# Patient Record
Sex: FEMALE | Race: WHITE | NOT HISPANIC OR LATINO | Employment: FULL TIME | URBAN - METROPOLITAN AREA
[De-identification: names, ages, dates, MRNs, and addresses within clinical notes are randomized per-mention and may not be internally consistent; named-entity substitution may affect disease eponyms.]

---

## 2023-10-26 NOTE — PROGRESS NOTES
Gastroenterology Office Visit     History of Present Illness:   Alvin Ge is a 42 y.o. female who presents to GI clinic for possible blockage having not moved her bowels in a week and a half.    It has been about 3 weeks since she has had a regular bowel movement. She has passed some sludge, mud, about twice and was a small to medium amount. She described it as dark brown in color and very smelly. She has has passed small pieces of formed stool.  She is straining a lot. She has noticed bright red blood on the stool. Sometimes she passes clots of blood.     She has tried taking laxative pills, milk of magnesium, prune juice,   She has not tried enemas or suppositories. She has also tried eating foods that typically cause diarrhea ie Chinese food    She is used to moving her bowels every day to every other day of normal caliber and color. She can boarder on constipation or loose stool.    She endorses feeling bloated and uncomfortable. She endorses nausea without vomiting. She denies odynophagia or dysphagia. She endorses acid reflux which is controlled on omeprazole once daily.     Last colonoscopy: 9/2017  Last endosopy: 9/2017    Abdominal surgeries include cholecystectomy and tubal ligation    Review of Systems  Review of Systems   Constitutional:  Positive for appetite change. Negative for chills, fever and unexpected weight change.   HENT:  Negative for mouth sores, sore throat and trouble swallowing.    Eyes:  Negative for pain and visual disturbance.   Respiratory:  Positive for shortness of breath (gravity). Negative for cough and wheezing.    Cardiovascular:  Negative for chest pain.   Genitourinary:  Negative for decreased urine volume, dysuria and hematuria.   Musculoskeletal:  Negative for arthralgias, joint swelling and myalgias.   Skin:  Negative for pallor and rash.   Neurological:  Negative for dizziness, weakness, numbness and headaches.       Past Medical History   has a past medical history of  A-fib (CMS/HCC), Gastro-esophageal reflux disease with esophagitis, without bleeding (10/10/2017), Gluten intolerance, and LBBB (left bundle branch block).     Past Surgical History  Past Surgical History:   Procedure Laterality Date    CHOLECYSTECTOMY  09/05/2017    Cholecystectomy    TUBAL LIGATION  09/05/2017    Tubal Ligation       Social History   reports that she quit smoking about 6 years ago. Her smoking use included cigarettes. She does not have any smokeless tobacco history on file. She reports current alcohol use. She reports that she does not use drugs.     Family History  family history includes Breast cancer in her mother; Diabetes in an other family member; Hypertension in her father and another family member; Pancreatic cancer in her father; myocardial infarction in an other family member.   No family history of stomach or colon cancer    Allergies  Allergies   Allergen Reactions    Covid-19 Vaccine, Mrna, [id number], Lnp-S (Moderna) Unknown    Gluten Unknown       Medications  Current Outpatient Medications   Medication Instructions    dilTIAZem (CARDIZEM) 30 mg, oral    magnesium oxide 400 mg magnesium capsule oral    mupirocin (Bactroban) 2 % ointment 2 times daily    naproxen (Naprosyn) 500 mg tablet 1 tablet, oral, Every 12 hours    nebivolol (Bystolic) 5 mg tablet 1 tablet, oral, Daily    nebivolol (BYSTOLIC) 2.5 mg, oral, Daily RT    omeprazole (PriLOSEC) 40 mg DR capsule 1 capsule, oral, Daily    SUMAtriptan (IMITREX) 50 mg, oral        Objective   Visit Vitals  BP (!) 144/91   Pulse 96        Physical Exam  Constitutional:       General: She is not in acute distress.     Appearance: Normal appearance.   HENT:      Mouth/Throat:      Mouth: Mucous membranes are moist.      Pharynx: Oropharynx is clear.   Eyes:      General: No scleral icterus.     Extraocular Movements: Extraocular movements intact.      Conjunctiva/sclera: Conjunctivae normal.      Pupils: Pupils are equal, round, and  reactive to light.   Cardiovascular:      Rate and Rhythm: Normal rate and regular rhythm.      Heart sounds: No murmur heard.  Pulmonary:      Effort: Pulmonary effort is normal.      Breath sounds: No wheezing or rhonchi.   Abdominal:      General: Bowel sounds are normal. There is no distension.      Palpations: Abdomen is soft. There is no mass.      Tenderness: There is no abdominal tenderness.      Hernia: No hernia is present.   Musculoskeletal:         General: No swelling or deformity.   Skin:     General: Skin is warm.      Coloration: Skin is not jaundiced.   Neurological:      General: No focal deficit present.      Mental Status: She is alert and oriented to person, place, and time.   Psychiatric:         Mood and Affect: Mood normal.         LABS  Pertinent labs were reviewed with the patient  Component  Ref Range & Units 8 mo ago   WBC  3.70 - 11.00 k/uL 9.18   RBC  3.90 - 5.20 m/uL 5.09   Hemoglobin  11.5 - 15.5 g/dL 13.2   Hematocrit  36.0 - 46.0 % 40.6   MCV  80.0 - 100.0 fL 79.8 Low    MCH  26.0 - 34.0 pg 25.9 Low    MCHC  30.5 - 36.0 g/dL 32.5   RDW-CV  11.5 - 15.0 % 13.5   Platelet Count  150 - 400 k/uL 297   MPV  9.0 - 12.7 fL 9.5   Neutrophils %  % 61.7   Abs Neut  1.45 - 7.50 k/uL 5.66   Lymphocytes %  % 29.7   Abs Lymph  1.00 - 4.00 k/uL 2.73   Monocytes %  % 5.8   Abs Mono  <0.87 k/uL 0.53   Eosinophils %  % 2.1   Abs Eosin  <0.46 k/uL 0.19   Basophils %  % 0.4   Abs Baso  <0.11 k/uL 0.04   Immature Granulocytes %  % 0.3   Abs Immature Gran  <0.10 k/uL 0.03   NRBC  /100 WBC 0.0   Absolute nRBC  <0.01 k/uL <0.01   Diff Type Auto     Component  Ref Range & Units 8 mo ago Comments   TSH  0.270 - 4.200 mIU/L 1.900      Component  Ref Range & Units 8 mo ago Comments   Protein, Total  6.3 - 8.0 g/dL 7.3    Albumin  3.9 - 4.9 g/dL 4.5    Calcium, Total  8.5 - 10.2 mg/dL 9.2    Bilirubin, Total  0.2 - 1.3 mg/dL 0.8    Alkaline Phosphatase  34 - 123 U/L 45    AST  13 - 35 U/L 20    ALT  7 - 38 U/L  22    Glucose  74 - 99 mg/dL 109 High       Radiology  Pertinent radiology was reviewed with the patient  NA    Endoscopy  Colonoscopy 9/28/2017 by Kb Tesfaye   Indication pain centered at the epigastrium  Impression  External and internal hemorrhoids found  Normal terminal ileum  Mild diverticulosis found in the descending colon and in the sigmoid colon  No inflammation seen in the TI and colon    EGD 9/28/2017 by Dr Tesfaye     FINAL DIAGNOSIS    A.  SMALL BOWEL BIOPSY:        - Duodenal mucosa with no significant histopathologic abnormalities.      B.  RIGHT COLON BIOPSY:        - Colonic mucosa with no significant histopathologic abnormalities.      C.  TRANSVERSE COLON BIOPSY:        - Colonic mucosa with foci of red cell extravasation in lamina propria,   not otherwise          diagnostic.      D.  LEFT COLON BIOPSY:        - Colonic mucosa with foci of red cell extravasation in lamina propria,   not otherwise diagnostic.     Assessment/Plan   Alvin Ge is a 42 y.o. female who presents to GI clinic for possible blockage having not moved her bowels in a week and a half..    GERD (gastroesophageal reflux disease)  Small hiatal hernia.  Currently controlled on 20 mg omeprazole daily  Educated on taking 15 to 30 minutes before her first cup of coffee a day.      Alvin was seen today for constipation.  Diagnoses and all orders for this visit:  Constipation, unspecified constipation type  -     XR abdomen 1 view; Future       Recommend follow-up in 2 months  Kay Tellez PA-C

## 2023-10-28 PROBLEM — L71.9 ROSACEA: Status: ACTIVE | Noted: 2023-10-28

## 2023-10-28 PROBLEM — R11.2 NAUSEA AND VOMITING IN ADULT: Status: ACTIVE | Noted: 2023-10-28

## 2023-10-28 PROBLEM — I49.8 ATRIAL ARRHYTHMIA: Status: ACTIVE | Noted: 2023-10-28

## 2023-10-28 PROBLEM — M62.830 BACK SPASM: Status: ACTIVE | Noted: 2023-10-28

## 2023-10-28 PROBLEM — L55.0 SUPERFICIAL SUNBURN: Status: ACTIVE | Noted: 2023-10-28

## 2023-10-28 PROBLEM — G47.33 OSA ON CPAP: Status: ACTIVE | Noted: 2023-10-28

## 2023-10-28 PROBLEM — F41.9 ANXIETY DISORDER: Status: ACTIVE | Noted: 2023-10-28

## 2023-10-28 PROBLEM — R20.2 PARESTHESIA OF LOWER LIMB: Status: ACTIVE | Noted: 2023-10-28

## 2023-10-28 PROBLEM — M54.6 MIDLINE THORACIC BACK PAIN: Status: ACTIVE | Noted: 2023-10-28

## 2023-10-28 PROBLEM — E55.9 VITAMIN D DEFICIENCY: Status: ACTIVE | Noted: 2023-10-28

## 2023-10-28 PROBLEM — K51.90 ULCERATIVE COLITIS (MULTI): Status: ACTIVE | Noted: 2023-10-28

## 2023-10-28 PROBLEM — K44.9 HIATAL HERNIA: Status: ACTIVE | Noted: 2023-10-28

## 2023-10-28 PROBLEM — R11.10 VOMITING: Status: ACTIVE | Noted: 2023-10-28

## 2023-10-28 PROBLEM — R92.8 MAMMOGRAM ABNORMAL: Status: ACTIVE | Noted: 2023-10-28

## 2023-10-28 PROBLEM — K21.9 GERD (GASTROESOPHAGEAL REFLUX DISEASE): Status: ACTIVE | Noted: 2023-10-28

## 2023-10-28 PROBLEM — K62.5 RECTAL BLEEDING: Status: ACTIVE | Noted: 2023-10-28

## 2023-10-28 PROBLEM — M54.81 OCCIPITAL NEURALGIA: Status: ACTIVE | Noted: 2023-10-28

## 2023-10-28 PROBLEM — B02.9 SHINGLES: Status: ACTIVE | Noted: 2023-10-28

## 2023-10-28 PROBLEM — R00.2 PALPITATIONS: Status: ACTIVE | Noted: 2023-10-28

## 2023-10-28 PROBLEM — R51.9 HEADACHE: Status: ACTIVE | Noted: 2023-10-28

## 2023-10-28 PROBLEM — K90.41 GLUTEN INTOLERANCE: Status: ACTIVE | Noted: 2023-10-28

## 2023-10-28 PROBLEM — R14.0 ABDOMINAL BLOATING: Status: ACTIVE | Noted: 2023-10-28

## 2023-10-28 RX ORDER — OMEPRAZOLE 40 MG/1
1 CAPSULE, DELAYED RELEASE ORAL DAILY
COMMUNITY
Start: 2021-11-15 | End: 2023-11-27 | Stop reason: WASHOUT

## 2023-10-28 RX ORDER — NEBIVOLOL 5 MG/1
1 TABLET ORAL DAILY
COMMUNITY
End: 2023-11-20 | Stop reason: ALTCHOICE

## 2023-10-28 RX ORDER — NEBIVOLOL 2.5 MG/1
2.5 TABLET ORAL
COMMUNITY
Start: 2023-06-12

## 2023-10-28 RX ORDER — DILTIAZEM HYDROCHLORIDE 30 MG/1
30 TABLET, FILM COATED ORAL
COMMUNITY

## 2023-10-28 RX ORDER — SUMATRIPTAN 50 MG/1
50 TABLET, FILM COATED ORAL
COMMUNITY
End: 2023-11-27 | Stop reason: WASHOUT

## 2023-10-28 RX ORDER — NAPROXEN 500 MG/1
1 TABLET ORAL EVERY 12 HOURS
COMMUNITY
Start: 2022-05-31 | End: 2023-11-27 | Stop reason: WASHOUT

## 2023-10-28 RX ORDER — MUPIROCIN 20 MG/G
OINTMENT TOPICAL 2 TIMES DAILY
COMMUNITY
Start: 2022-05-31 | End: 2023-11-20 | Stop reason: ALTCHOICE

## 2023-10-30 ENCOUNTER — HOSPITAL ENCOUNTER (OUTPATIENT)
Dept: RADIOLOGY | Facility: HOSPITAL | Age: 43
Discharge: HOME | End: 2023-10-30
Payer: COMMERCIAL

## 2023-10-30 ENCOUNTER — OFFICE VISIT (OUTPATIENT)
Dept: GASTROENTEROLOGY | Facility: CLINIC | Age: 43
End: 2023-10-30
Payer: COMMERCIAL

## 2023-10-30 VITALS
SYSTOLIC BLOOD PRESSURE: 144 MMHG | WEIGHT: 214.8 LBS | BODY MASS INDEX: 34.52 KG/M2 | DIASTOLIC BLOOD PRESSURE: 91 MMHG | HEART RATE: 96 BPM | HEIGHT: 66 IN

## 2023-10-30 DIAGNOSIS — K59.00 CONSTIPATION, UNSPECIFIED CONSTIPATION TYPE: Primary | ICD-10-CM

## 2023-10-30 DIAGNOSIS — K59.00 CONSTIPATION, UNSPECIFIED CONSTIPATION TYPE: ICD-10-CM

## 2023-10-30 PROBLEM — K59.09 OTHER CONSTIPATION: Status: ACTIVE | Noted: 2023-10-30

## 2023-10-30 PROBLEM — K51.90 ULCERATIVE COLITIS (MULTI): Status: RESOLVED | Noted: 2023-10-28 | Resolved: 2023-10-30

## 2023-10-30 PROCEDURE — 74018 RADEX ABDOMEN 1 VIEW: CPT | Performed by: RADIOLOGY

## 2023-10-30 PROCEDURE — 99204 OFFICE O/P NEW MOD 45 MIN: CPT | Performed by: PHYSICIAN ASSISTANT

## 2023-10-30 PROCEDURE — 74018 RADEX ABDOMEN 1 VIEW: CPT

## 2023-10-30 RX ORDER — CALCIUM CARBONATE/VITAMIN D3 500-10/5ML
LIQUID (ML) ORAL
COMMUNITY

## 2023-10-30 ASSESSMENT — ENCOUNTER SYMPTOMS
COUGH: 0
NUMBNESS: 0
WEAKNESS: 0
DIZZINESS: 0
DYSURIA: 0
HEMATURIA: 0
TROUBLE SWALLOWING: 0
ARTHRALGIAS: 0
MYALGIAS: 0
SORE THROAT: 0
EYE PAIN: 0
HEADACHES: 0
WHEEZING: 0
SHORTNESS OF BREATH: 1
FEVER: 0
JOINT SWELLING: 0
UNEXPECTED WEIGHT CHANGE: 0
CHILLS: 0
APPETITE CHANGE: 1

## 2023-10-30 NOTE — ASSESSMENT & PLAN NOTE
Small hiatal hernia.  Currently controlled on 20 mg omeprazole daily  Educated on taking 15 to 30 minutes before her first cup of coffee a day.

## 2023-10-30 NOTE — PATIENT INSTRUCTIONS
Thank you for seeing us in clinic today!     In summary, please do the following:  Recommend xray belly   Will follow with a miralax bowel cleanse  Ducolax Suppositories in the meantime, every 8 hours  Simethicone (GasX) for gas pain      We will see you again in 2 month   If you have any questions or concerns, please call the office at [phone]

## 2023-11-01 ENCOUNTER — TELEPHONE (OUTPATIENT)
Dept: GASTROENTEROLOGY | Facility: CLINIC | Age: 43
End: 2023-11-01
Payer: COMMERCIAL

## 2023-11-15 ENCOUNTER — OFFICE VISIT (OUTPATIENT)
Dept: GASTROENTEROLOGY | Facility: CLINIC | Age: 43
End: 2023-11-15
Payer: COMMERCIAL

## 2023-11-15 VITALS
BODY MASS INDEX: 33.83 KG/M2 | HEIGHT: 66 IN | SYSTOLIC BLOOD PRESSURE: 153 MMHG | DIASTOLIC BLOOD PRESSURE: 94 MMHG | WEIGHT: 210.5 LBS | HEART RATE: 87 BPM

## 2023-11-15 DIAGNOSIS — K59.09 OTHER CONSTIPATION: ICD-10-CM

## 2023-11-15 DIAGNOSIS — R11.0 NAUSEA: ICD-10-CM

## 2023-11-15 DIAGNOSIS — K92.1 HEMATOCHEZIA: ICD-10-CM

## 2023-11-15 DIAGNOSIS — R19.4 CHANGE IN BOWEL HABITS: Primary | ICD-10-CM

## 2023-11-15 DIAGNOSIS — K21.9 GASTROESOPHAGEAL REFLUX DISEASE, UNSPECIFIED WHETHER ESOPHAGITIS PRESENT: ICD-10-CM

## 2023-11-15 PROCEDURE — 99214 OFFICE O/P EST MOD 30 MIN: CPT | Performed by: PHYSICIAN ASSISTANT

## 2023-11-15 RX ORDER — ONDANSETRON 4 MG/1
4 TABLET, FILM COATED ORAL EVERY 8 HOURS PRN
Qty: 6 TABLET | Refills: 0 | Status: SHIPPED | OUTPATIENT
Start: 2023-11-15 | End: 2023-11-17

## 2023-11-15 RX ORDER — LUBIPROSTONE 8 UG/1
8 CAPSULE ORAL
Qty: 30 CAPSULE | Refills: 11 | Status: SHIPPED | OUTPATIENT
Start: 2023-11-15 | End: 2023-11-27 | Stop reason: WASHOUT

## 2023-11-15 ASSESSMENT — ENCOUNTER SYMPTOMS
TROUBLE SWALLOWING: 0
SORE THROAT: 0
WEAKNESS: 0
EYE PAIN: 0
CHILLS: 0
MYALGIAS: 0
DIZZINESS: 0
HEMATURIA: 0
HEADACHES: 0
NUMBNESS: 0
ARTHRALGIAS: 0
DYSURIA: 0
UNEXPECTED WEIGHT CHANGE: 0
APPETITE CHANGE: 0
FEVER: 0
JOINT SWELLING: 0

## 2023-11-15 NOTE — PROGRESS NOTES
Gastroenterology Office Visit     History of Present Illness:   Alvin Ge is a 43 y.o. female who presents to GI clinic for constipation.    She did complete the bowel clean out but didn't have a bowel movement until she finished the second half. It was a couple days before she passed more stool. She did feel better after having moved her bowels. She is in pain after trying to go to the bathroom. She has noticed blood in the toilet. It is turning the toilet bowel red about once a day. There has been a could times she has only passed blood. This could be from her hemorrhoids. She feels like she has a lump in LLQ.     She has had one episode of vomiting.    She is used to moving her bowels every day to every other day of normal caliber and color. She can boarder on constipation or loose stool.     Last colonoscopy: 9/2017  Last endosopy: 9/2017    Abdominal surgeries include cholecystectomy and tubal ligation     Review of Systems  Review of Systems   Constitutional:  Negative for appetite change, chills, fever and unexpected weight change.   HENT:  Negative for mouth sores, sore throat and trouble swallowing.    Eyes:  Negative for pain and visual disturbance.   Cardiovascular:  Negative for chest pain.   Genitourinary:  Negative for decreased urine volume, dysuria and hematuria.   Musculoskeletal:  Negative for arthralgias, joint swelling and myalgias.   Skin:  Negative for pallor and rash.   Neurological:  Negative for dizziness, weakness, numbness and headaches.       Past Medical History   has a past medical history of A-fib (CMS/HCC), Gastro-esophageal reflux disease with esophagitis, without bleeding (10/10/2017), Gluten intolerance, and LBBB (left bundle branch block).     Past Surgical History  Past Surgical History:   Procedure Laterality Date    CHOLECYSTECTOMY  09/05/2017    Cholecystectomy    TUBAL LIGATION  09/05/2017    Tubal Ligation       Social History   reports that she quit smoking about 6  years ago. Her smoking use included cigarettes. She does not have any smokeless tobacco history on file. She reports current alcohol use. She reports that she does not use drugs.     Family History  family history includes Breast cancer in her mother; Diabetes in an other family member; Hypertension in her father and another family member; Pancreatic cancer in her father; myocardial infarction in an other family member.   No family history of stomach or colon cancer    Allergies  Allergies   Allergen Reactions    Covid-19 Vaccine, Mrna, [id number], Lnp-S (Moderna) Unknown    Gluten Unknown       Medications  Current Outpatient Medications   Medication Instructions    dilTIAZem (CARDIZEM) 30 mg, oral    lubiprostone (AMITIZA) 8 mcg, oral, Daily with breakfast    magnesium oxide 400 mg magnesium capsule oral    mupirocin (Bactroban) 2 % ointment 2 times daily    naproxen (Naprosyn) 500 mg tablet 1 tablet, oral, Every 12 hours    nebivolol (Bystolic) 5 mg tablet 1 tablet, oral, Daily    nebivolol (BYSTOLIC) 2.5 mg, oral, Daily RT    omeprazole (PriLOSEC) 40 mg DR capsule 1 capsule, oral, Daily    ondansetron (ZOFRAN) 4 mg, oral, Every 8 hours PRN    SUMAtriptan (IMITREX) 50 mg, oral        Objective   Visit Vitals  BP (!) 153/94   Pulse 87        Physical Exam  Constitutional:       General: She is not in acute distress.     Appearance: Normal appearance.   HENT:      Mouth/Throat:      Mouth: Mucous membranes are moist.      Pharynx: Oropharynx is clear.   Eyes:      General: No scleral icterus.     Extraocular Movements: Extraocular movements intact.      Conjunctiva/sclera: Conjunctivae normal.      Pupils: Pupils are equal, round, and reactive to light.   Cardiovascular:      Rate and Rhythm: Normal rate and regular rhythm.      Heart sounds: No murmur heard.  Pulmonary:      Effort: Pulmonary effort is normal.      Breath sounds: No wheezing or rhonchi.   Abdominal:      General: Bowel sounds are normal. There is  "no distension.      Palpations: Abdomen is soft. There is no mass.      Tenderness: There is no abdominal tenderness.      Hernia: No hernia is present.      Comments: Small hard lump on the abdominal wall, feels like a lipoma.    Musculoskeletal:         General: No swelling or deformity.   Skin:     General: Skin is warm.      Coloration: Skin is not jaundiced.   Neurological:      General: No focal deficit present.      Mental Status: She is alert and oriented to person, place, and time.   Psychiatric:         Mood and Affect: Mood normal.         LABS  Pertinent labs were reviewed with the patient  No results found for: \"WBC\", \"HGB\", \"HCT\", \"PLT\"   Lab Results   Component Value Date     06/07/2021    K 4.0 06/07/2021     06/07/2021    CO2 27 06/07/2021    BUN 14 06/07/2021    CREATININE 0.62 06/07/2021    GLUCOSE 106 (H) 06/07/2021    CALCIUM 9.3 06/07/2021      Lab Results   Component Value Date    ALKPHOS 43 06/07/2021    ALKPHOS 41 11/14/2019    BILITOT 0.6 06/07/2021    BILITOT 0.9 11/14/2019    PROT 7.2 06/07/2021    PROT 7.0 11/14/2019    ALT 20 06/07/2021    ALT 24 11/14/2019    AST 17 06/07/2021    AST 20 11/14/2019      No results found for: \"INR\"   No results found for: \"IRON\", \"TIBC\"   No results found for: \"FERRITIN\"   No results found for: \"TSH\", \"T3FREE\", \"FREET4\"     Radiology  Pertinent radiology was reviewed with the patient  KUB 10/30/2023  FINDINGS:  Surgical clips overlie the right upper quadrant. Tubal ligation clips overlie the pelvis bilaterally. There are multiple vascular calcifications in the pelvis greater on the left. No unusual calcific densities overlie the kidneys of the right kidney is obscured by bowel gas and fecal debris.      The overall bowel gas pattern is nonspecific. The osseous structures are intact.      IMPRESSION:  Nonspecific bowel-gas pattern.      The need for further workup should be determined clinically.    Endoscopy  Colonoscopy 9/28/2017 by Kb" Mera   Indication pain centered at the epigastrium  Impression  External and internal hemorrhoids found  Normal terminal ileum  Mild diverticulosis found in the descending colon and in the sigmoid colon  No inflammation seen in the TI and colon     EGD 9/28/2017 by Dr Tesfaye   FINAL DIAGNOSIS    A.  SMALL BOWEL BIOPSY:   - Duodenal mucosa with no significant histopathologic abnormalities.      B.  RIGHT COLON BIOPSY:   - Colonic mucosa with no significant histopathologic abnormalities.      C.  TRANSVERSE COLON BIOPSY:        - Colonic mucosa with foci of red cell extravasation in lamina propria, not otherwise diagnostic.      D.  LEFT COLON BIOPSY:        - Colonic mucosa with foci of red cell extravasation in lamina propria, not otherwise diagnostic.     Assessment/Plan   Alvin Ge is a 43 y.o. female who presents to GI clinic for continued constipation.    Other constipation  Hasn't had a normal a normal BM in 3 weeks.  Has tried milk of mag (one month), prune juice (two attempts), laxative pills (two attempts).  Has had multiple small stools. Failed Miralax bowel clean out and daily use for 2 weeks.    Insurance with PA seems to prefer amitiza     GERD (gastroesophageal reflux disease)  Small hiatal hernia.  Currently controlled on 20 mg omeprazole daily  Educated on taking 15 to 30 minutes before her first cup of coffee a day.      Alvin was seen today for constipation.  Diagnoses and all orders for this visit:  Change in bowel habits  -     Colonoscopy Diagnostic; Future  Hematochezia  -     Colonoscopy Diagnostic; Future  Nausea  -     ondansetron (Zofran) 4 mg tablet; Take 1 tablet (4 mg) by mouth every 8 hours if needed for nausea or vomiting for up to 2 days.  Other constipation  -     lubiprostone (Amitiza) 8 mcg capsule; Take 1 capsule (8 mcg) by mouth once daily with a meal.  Gastroesophageal reflux disease, unspecified whether esophagitis present       Recommend follow up 2 weeks after  endoscopy  Kay Tellez PA-C

## 2023-11-15 NOTE — ASSESSMENT & PLAN NOTE
Hasn't had a normal a normal BM in 3 weeks.  Has tried milk of mag (one month), prune juice (two attempts), laxative pills (two attempts).  Has had multiple small stools. Failed Miralax bowel clean out and daily use for 2 weeks.    Insurance with PA seems to prefer [facility]

## 2023-11-15 NOTE — PATIENT INSTRUCTIONS
Thank you for seeing us in clinic today!     In summary, please do the following:  We will schedule you for your Colonoscopy at [facility] . This is a diagnostic colon  You will need a  the day of the exam   Can use Zofran for nausea during the pre  Can use simethicone to prevent gas pain (it is over the counter)      We will see you again in 2 weeks after the procedure   If you have any questions or concerns, please call the office at [phone]

## 2023-11-20 ENCOUNTER — ANESTHESIA EVENT (OUTPATIENT)
Dept: GASTROENTEROLOGY | Facility: EXTERNAL LOCATION | Age: 43
End: 2023-11-20

## 2023-11-20 ENCOUNTER — ANESTHESIA (OUTPATIENT)
Dept: GASTROENTEROLOGY | Facility: EXTERNAL LOCATION | Age: 43
End: 2023-11-20

## 2023-11-20 ENCOUNTER — HOSPITAL ENCOUNTER (OUTPATIENT)
Dept: GASTROENTEROLOGY | Facility: EXTERNAL LOCATION | Age: 43
Discharge: HOME | End: 2023-11-20
Payer: COMMERCIAL

## 2023-11-20 ENCOUNTER — APPOINTMENT (OUTPATIENT)
Dept: GASTROENTEROLOGY | Facility: CLINIC | Age: 43
End: 2023-11-20
Payer: COMMERCIAL

## 2023-11-20 VITALS
SYSTOLIC BLOOD PRESSURE: 135 MMHG | HEIGHT: 66 IN | OXYGEN SATURATION: 100 % | HEART RATE: 76 BPM | RESPIRATION RATE: 21 BRPM | WEIGHT: 210 LBS | DIASTOLIC BLOOD PRESSURE: 85 MMHG | BODY MASS INDEX: 33.75 KG/M2 | TEMPERATURE: 96.8 F

## 2023-11-20 DIAGNOSIS — K92.1 HEMATOCHEZIA: ICD-10-CM

## 2023-11-20 DIAGNOSIS — R19.4 CHANGE IN BOWEL HABITS: Primary | ICD-10-CM

## 2023-11-20 PROCEDURE — 88305 TISSUE EXAM BY PATHOLOGIST: CPT | Performed by: PATHOLOGY

## 2023-11-20 PROCEDURE — 45385 COLONOSCOPY W/LESION REMOVAL: CPT | Performed by: INTERNAL MEDICINE

## 2023-11-20 PROCEDURE — 88305 TISSUE EXAM BY PATHOLOGIST: CPT

## 2023-11-20 PROCEDURE — 0753T DGTZ GLS MCRSCP SLD LEVEL IV: CPT

## 2023-11-20 RX ORDER — SODIUM CHLORIDE 9 MG/ML
20 INJECTION, SOLUTION INTRAVENOUS CONTINUOUS
Status: DISCONTINUED | OUTPATIENT
Start: 2023-11-20 | End: 2023-11-21 | Stop reason: HOSPADM

## 2023-11-20 RX ORDER — ONDANSETRON HYDROCHLORIDE 2 MG/ML
4 INJECTION, SOLUTION INTRAVENOUS ONCE AS NEEDED
Status: DISCONTINUED | OUTPATIENT
Start: 2023-11-20 | End: 2023-11-21 | Stop reason: HOSPADM

## 2023-11-20 RX ORDER — PROPOFOL 10 MG/ML
INJECTION, EMULSION INTRAVENOUS AS NEEDED
Status: DISCONTINUED | OUTPATIENT
Start: 2023-11-20 | End: 2023-11-20

## 2023-11-20 RX ADMIN — PROPOFOL 20 MG: 10 INJECTION, EMULSION INTRAVENOUS at 11:23

## 2023-11-20 RX ADMIN — SODIUM CHLORIDE: 9 INJECTION, SOLUTION INTRAVENOUS at 11:02

## 2023-11-20 RX ADMIN — PROPOFOL 100 MG: 10 INJECTION, EMULSION INTRAVENOUS at 11:05

## 2023-11-20 RX ADMIN — PROPOFOL 50 MG: 10 INJECTION, EMULSION INTRAVENOUS at 11:07

## 2023-11-20 RX ADMIN — PROPOFOL 30 MG: 10 INJECTION, EMULSION INTRAVENOUS at 11:18

## 2023-11-20 RX ADMIN — PROPOFOL 50 MG: 10 INJECTION, EMULSION INTRAVENOUS at 11:13

## 2023-11-20 SDOH — HEALTH STABILITY: MENTAL HEALTH: CURRENT SMOKER: 0

## 2023-11-20 ASSESSMENT — COLUMBIA-SUICIDE SEVERITY RATING SCALE - C-SSRS
1. IN THE PAST MONTH, HAVE YOU WISHED YOU WERE DEAD OR WISHED YOU COULD GO TO SLEEP AND NOT WAKE UP?: NO
2. HAVE YOU ACTUALLY HAD ANY THOUGHTS OF KILLING YOURSELF?: NO
6. HAVE YOU EVER DONE ANYTHING, STARTED TO DO ANYTHING, OR PREPARED TO DO ANYTHING TO END YOUR LIFE?: NO

## 2023-11-20 ASSESSMENT — PAIN - FUNCTIONAL ASSESSMENT
PAIN_FUNCTIONAL_ASSESSMENT: 0-10

## 2023-11-20 ASSESSMENT — PAIN SCALES - GENERAL
PAINLEVEL_OUTOF10: 0 - NO PAIN
PAIN_LEVEL: 0
PAINLEVEL_OUTOF10: 0 - NO PAIN

## 2023-11-20 NOTE — H&P
Outpatient Hospital Procedure H&P    Patient Profile-Procedures  Initial Info  Patient Demographics  Name Alvin Ge  Date of Birth 1980  MRN [id number]  Address   [address] [id number] [place] [address]    Primary Phone Number [phone]  Secondary Phone Number    PCP Bob Linda    Procedure(s):  Colonoscopy    Primary contact name and number   Extended Emergency Contact Information  Primary Emergency Contact: Sunil Ge  Home Phone: [phone]  Work Phone: [phone]  Relation: Friend    General Health  Weight   Vitals:    11/20/23 1024   Weight: 95.3 kg (210 lb)     BMI Body mass index is 34.41 kg/m².    Allergies  Allergies   Allergen Reactions    Covid-19 Vaccine, Mrna, [id number], Lnp-S (Moderna) Unknown    Gluten Unknown       Past Medical History   Past Medical History:   Diagnosis Date    A-fib (CMS/[facility])     Gastro-esophageal reflux disease with esophagitis, without bleeding 10/10/2017    Gastroesophageal reflux disease with esophagitis    Gluten intolerance     LBBB (left bundle branch block)        Provider assessment  Diagnosis: Constipation    Medication Reviewed - yes  Prior to Admission medications    Medication Sig Start Date End Date Taking? Authorizing Provider   magnesium oxide 400 mg magnesium capsule Take by mouth.   Yes Historical Provider, MD   nebivolol (Bystolic) 2.5 mg tablet Take 1 tablet (2.5 mg) by mouth once daily. 6/12/23  Yes Historical Provider, MD   omeprazole (PriLOSEC) 40 mg DR capsule Take 1 capsule (40 mg) by mouth once daily. 11/15/21  Yes Historical Provider, MD   dilTIAZem (Cardizem) 30 mg immediate release tablet Take 1 tablet (30 mg) by mouth.    Historical Provider, MD   lubiprostone (Amitiza) 8 mcg capsule Take 1 capsule (8 mcg) by mouth once daily with a meal. 11/15/23 11/14/24  Kay Tellez PA-C   naproxen (Naprosyn) 500 mg tablet Take 1 tablet (500 mg) by mouth every 12 hours. 5/31/22    Historical Provider, MD   ondansetron (Zofran) 4 mg tablet Take 1 tablet (4 mg) by mouth every 8 hours if needed for nausea or vomiting for up to 2 days. 11/15/23 11/17/23  Kay Tellez PA-C   SUMAtriptan (Imitrex) 50 mg tablet Take 1 tablet (50 mg) by mouth.    Historical Provider, MD   mupirocin (Bactroban) 2 % ointment twice a day. 5/31/22 11/20/23  Historical Provider, MD   nebivolol (Bystolic) 5 mg tablet Take 1 tablet (5 mg) by mouth once daily.  11/20/23  Historical Provider, MD       Physical Exam  Vitals:    11/20/23 1024   BP: (!) 137/98   Pulse: 75   Resp: 21   Temp: 36.3 °C (97.3 °F)   SpO2: 97%        General: A&Ox3, NAD.  HEENT: AT/NC.   CV: RRR. No murmur.  Resp: CTA bilaterally. No wheezing, rhonchi or rales.   GI: Soft, NT/ND. BSx4.  Extrem: No edema. Pulses intact.  Skin: No Jaundice.   Neuro: No focal deficits.   Psych: Normal mood and affect.        Oropharyngeal Classification II (hard and soft palate, upper portion of tonsils anduvula visible)  ASA PS Classification 3  Sedation Plan Deep  Procedure Plan - pre-procedural (re)assesment completed by physician:  discharge/transfer patient when discharge criteria met    Steven Dickerson MD  11/20/2023 10:55 AM

## 2023-11-20 NOTE — DISCHARGE INSTRUCTIONS
The anesthetics, sedatives and pain killers which were given to you will be acting in your body for the next 24 hours. This may cause you to feel sleepy. This feeling will slowly wear off. For the next 24 hours you SHOULD NOT:    Drive a car  Operate machinery or power tools.  Drink any form of alcohol, including beer or wine.  Make any important decisions or sign and legal documents.    You may eat anything as long as your physician has not warned you to stay away from certain foods. However, it is better to start with liquids,  then progress to softer foods, and gradually work up to solid foods.    We strongly suggest that a responsible adult be with you for the rest of the day and also the night. This is for your protection and safety since you may not be as alert as usual. You should be especially careful climbing stairs.     If you experience bleeding, fever, shortness of breath, chest pain, or extreme abdominal pain go to the nearest Emergency Room.    [facility] Phone Number [phone]

## 2023-11-20 NOTE — ANESTHESIA PREPROCEDURE EVALUATION
Patient: Alvin Ge    Procedure Information       Date/Time: 11/20/23 1110    Scheduled providers: Steven Dickerson MD    Procedure: COLONOSCOPY    Location: [facility]            Relevant Problems   Cardiovascular   (+) Atrial arrhythmia   (+) Midline thoracic back pain      GI   (+) GERD (gastroesophageal reflux disease)   (+) Hiatal hernia   (+) Rectal bleeding      Neuro/Psych   (+) Anxiety disorder   (+) Occipital neuralgia      Pulmonary   (+) BOUBACAR on CPAP      Infectious Disease   (+) Shingles       Clinical information reviewed:   Tobacco  Allergies  Meds  Problems  Med Hx  Surg Hx  OB Status    Fam Hx  Soc Hx        NPO Detail:  NPO/Void Status  Carbonhydrate Drink Given Prior to Surgery? : N  Date of Last Liquid: 11/20/23  Time of Last Liquid: 0530  Date of Last Solid: 11/15/23  Time of Last Solid: 2000  Last Intake Type: Clear fluids  Time of Last Void: 1026         Physical Exam    Airway  Mallampati: II  TM distance: >3 FB  Neck ROM: full     Cardiovascular - normal exam  Rhythm: regular  Rate: normal     Dental - normal exam     Pulmonary - normal exam  Breath sounds clear to auscultation     Abdominal - normal exam  (+) obese  Abdomen: soft         Anesthesia Plan    ASA 2     MAC     The patient is not a current smoker.  Education provided regarding risk of obstructive sleep apnea.  intravenous induction   Anesthetic plan and risks discussed with patient.  Use of blood products discussed with who consented to blood products.    Plan discussed with CRNA.

## 2023-11-20 NOTE — ANESTHESIA POSTPROCEDURE EVALUATION
Patient: Alvin Ge    Procedure Summary       Date: 11/20/23 Room / Location: [facility]    Anesthesia Start: 1102 Anesthesia Stop: 1129    Procedure: COLONOSCOPY Diagnosis:       Change in bowel habits      Hematochezia      Change in bowel habits    Scheduled Providers: Steven Dickerson MD Responsible Provider: TRAVIS Estrada    Anesthesia Type: MAC ASA Status: 2            Anesthesia Type: MAC    Vitals Value Taken Time   /80 11/20/23 1130   Temp 36.3 11/20/23 1130   Pulse 82 11/20/23 1130   Resp 19 11/20/23 1130   SpO2 100 11/20/23 1130       Anesthesia Post Evaluation    Patient location during evaluation: bedside  Patient participation: complete - patient participated  Level of consciousness: awake  Pain score: 0  Pain management: adequate  Airway patency: patent  Cardiovascular status: acceptable  Respiratory status: acceptable  Hydration status: acceptable  Postoperative Nausea and Vomiting: none      There were no known notable events for this encounter.

## 2023-11-21 NOTE — ADDENDUM NOTE
Encounter addended by: Renetta Bah RN on: 11/21/2023 1:06 PM   Actions taken: Contacts section saved

## 2023-11-21 NOTE — ADDENDUM NOTE
Encounter addended by: Renetta Bah RN on: 11/21/2023 1:07 PM   Actions taken: Contacts section saved, Flowsheet accepted

## 2023-11-27 ENCOUNTER — OFFICE VISIT (OUTPATIENT)
Dept: PRIMARY CARE | Facility: CLINIC | Age: 43
End: 2023-11-27
Payer: COMMERCIAL

## 2023-11-27 VITALS
WEIGHT: 212 LBS | DIASTOLIC BLOOD PRESSURE: 82 MMHG | HEART RATE: 83 BPM | HEIGHT: 66 IN | OXYGEN SATURATION: 96 % | BODY MASS INDEX: 34.07 KG/M2 | TEMPERATURE: 98.4 F | SYSTOLIC BLOOD PRESSURE: 126 MMHG

## 2023-11-27 DIAGNOSIS — Z00.00 ANNUAL PHYSICAL EXAM: Primary | ICD-10-CM

## 2023-11-27 DIAGNOSIS — R05.1 ACUTE COUGH: ICD-10-CM

## 2023-11-27 DIAGNOSIS — Z12.31 ENCOUNTER FOR SCREENING MAMMOGRAM FOR MALIGNANT NEOPLASM OF BREAST: ICD-10-CM

## 2023-11-27 PROCEDURE — 99396 PREV VISIT EST AGE 40-64: CPT | Performed by: FAMILY MEDICINE

## 2023-11-27 RX ORDER — OMEPRAZOLE 20 MG/1
20 CAPSULE, DELAYED RELEASE ORAL
COMMUNITY

## 2023-11-27 RX ORDER — BENZONATATE 200 MG/1
200 CAPSULE ORAL 3 TIMES DAILY PRN
Qty: 42 CAPSULE | Refills: 0 | Status: SHIPPED | OUTPATIENT
Start: 2023-11-27 | End: 2023-12-27

## 2023-11-27 ASSESSMENT — PATIENT HEALTH QUESTIONNAIRE - PHQ9
1. LITTLE INTEREST OR PLEASURE IN DOING THINGS: NOT AT ALL
SUM OF ALL RESPONSES TO PHQ9 QUESTIONS 1 AND 2: 0
2. FEELING DOWN, DEPRESSED OR HOPELESS: NOT AT ALL

## 2023-11-27 NOTE — PROGRESS NOTES
"Subjective   Chief complaint: Alvin Ge is a 43 y.o. female who presents for Annual Exam (Patient in office today for AWV. ).    HPI:  HPI  HAD LABS FROM HER CARDIOLOGIST IN MARCH  NEEDS LIPIDS  NEEDS TO GET INTO SEE OBGYN  NEEDS MAMM  NO FAM HIS COLON CA  JUST HAD COLONOSCOPY ON MONDAY  NORMAL PER PATIENT  2 POLYPS AND DIVERTIC  Also co cough for 1 week  No fever  Any wellness.  Health risk assessment reviewed.  Medical family history reviewed.  Provider care team reviewed.  No evidence of cognitive dysfunction.  Just had a colonoscopy.  Due for mammogram.  Advance care planning discussed with lifestyle modifications reinforced.  Physical activity as tolerated healthy diet medication compliance encouraged.  Medications reconciled.  Laboratory assessment ordered.  Also complains of a cough.  Objective   /82 (BP Location: Left arm, Patient Position: Sitting)   Pulse 83   Temp 36.9 °C (98.4 °F) (Temporal)   Ht 1.664 m (5' 5.5\")   Wt 96.2 kg (212 lb)   LMP 11/19/2023 (Exact Date) Comment: Waiver signed  SpO2 96%   BMI 34.74 kg/m²   Physical Exam  Vitals and nursing note reviewed.   Constitutional:       Appearance: Normal appearance.   HENT:      Head: Normocephalic and atraumatic.   Eyes:      Extraocular Movements: Extraocular movements intact.      Conjunctiva/sclera: Conjunctivae normal.      Pupils: Pupils are equal, round, and reactive to light.   Cardiovascular:      Rate and Rhythm: Normal rate and regular rhythm.      Heart sounds: Normal heart sounds.   Pulmonary:      Effort: Pulmonary effort is normal.      Breath sounds: Normal breath sounds.   Abdominal:      General: Abdomen is flat. Bowel sounds are normal.      Palpations: Abdomen is soft.   Musculoskeletal:         General: Normal range of motion.   Skin:     General: Skin is warm and dry.   Neurological:      General: No focal deficit present.      Mental Status: She is alert and oriented to person, place, and time. Mental status is " at baseline.   Psychiatric:         Mood and Affect: Mood normal.         Behavior: Behavior normal.       Review of Systems   I have reviewed and reconciled the medication list with the patient today.   Current Outpatient Medications:     dilTIAZem (Cardizem) 30 mg immediate release tablet, Take 1 tablet (30 mg) by mouth., Disp: , Rfl:     magnesium oxide 400 mg magnesium capsule, Take by mouth., Disp: , Rfl:     nebivolol (Bystolic) 2.5 mg tablet, Take 1 tablet (2.5 mg) by mouth once daily., Disp: , Rfl:     omeprazole (PriLOSEC) 20 mg DR capsule, Take 1 capsule (20 mg) by mouth. Do not crush or chew., Disp: , Rfl:     benzonatate (Tessalon) 200 mg capsule, Take 1 capsule (200 mg) by mouth 3 times a day as needed for cough. Do not crush or chew., Disp: 42 capsule, Rfl: 0     Imaging:  Colonoscopy Diagnostic    Result Date: 11/20/2023  Table formatting from the original result was not included. Impression 2 subcentimeter polyps in the splenic flexure and sigmoid colon were removed with cold snare Diverticulosis in the descending colon and sigmoid colon Medium hemorrhoids Findings Two 4 mm sessile polyps in the splenic flexure and sigmoid colon; performed cold snare with complete en bloc removal and retrieved specimen Multiple medium diverticula in the descending colon and sigmoid colon; no bleeding was identified External medium hemorrhoids observed during retroflexion; no bleeding was identified Recommendation  Await pathology results  Repeat colonoscopy in 5 years  Follow up with PCP       Follow up with SINGH Martini Indication Hematochezia, Change in bowel habits Post Procedure Diagnosis Colon polyps Diverticulosis Hemorrhoids Staff Staff Role No Staff Documented Medications See Anesthesia Record. Preprocedure A history and physical has been performed, and patient medication allergies have been reviewed. The patient's tolerance of previous anesthesia has been reviewed. The risks and benefits of the  procedure and the sedation options and risks were discussed with the patient. All questions were answered and informed consent obtained. Details of the Procedure The patient underwent monitored anesthesia care, which was administered by an anesthesia professional. The patient's blood pressure, heart rate, level of consciousness, respirations, oxygen, ECG and ETCO2 were monitored throughout the procedure. A digital rectal exam was performed. The scope was introduced through the anus and advanced to the cecum. Retroflexion was performed in the rectum. The quality of bowel preparation was evaluated using the [place] Bowel Preparation Scale with scores of: right colon = 2, transverse colon = 2, left colon = 2. The total BBPS score was 6. Bowel prep was adequate. The patient's estimated blood loss was minimal (<5 mL). The procedure was not difficult. The patient tolerated the procedure well. There were no apparent adverse events. Events Procedure Events Event Event Time ENDO SCOPE IN TIME 11/20/2023 11:06 AM ENDO CECUM REACHED 11/20/2023 11:11 AM ENDO SCOPE OUT TIME 11/20/2023 11:25 AM Specimens ID Type Source Tests Collected by Time 1 :  Tissue COLON - SPLENIC FLEXURE POLYP SURGICAL PATHOLOGY EXAM Marisa Jenkins RN 11/20/2023 1115 2 :  Tissue COLON - SIGMOID POLYP SURGICAL PATHOLOGY EXAM Marisa Jenkins RN 11/20/2023 1122 Procedure Location [facility] External Facility [facility] [address] [facility] [address] Referring Provider Kay Tellez Pa-c [address] [address] Procedure Provider Steven Dickerson MD    XR abdomen 1 view    Result Date: 10/31/2023  Interpreted By:  Ronni Del Rosario, STUDY: XR ABDOMEN 1 VIEW;  10/30/2023 9:23 am   INDICATION: Signs/Symptoms:Change in bowel habits, constipation.   COMPARISON: None.   ACCESSION NUMBER(S): [id number]   ORDERING CLINICIAN: KAY NASSAR   TECHNIQUE: 2 supine radiographs of the abdomen and pelvis were performed.   FINDINGS:  Surgical clips overlie the right upper quadrant. Tubal ligation clips overlie the pelvis bilaterally. There are multiple vascular calcifications in the pelvis greater on the left. No unusual calcific densities overlie the kidneys of the right kidney is obscured by bowel gas and fecal debris.   The overall bowel gas pattern is nonspecific. The osseous structures are intact.       Nonspecific bowel-gas pattern.   The need for further workup should be determined clinically.   Signed by: Ronni Del Rosario 10/31/2023 2:14 PM Dictation workstation:   [id number]       Labs reviewed:    Lab Results   Component Value Date    CHOL 202 (H) 06/07/2021    TRIG 118 06/07/2021    HDL 49.0 06/07/2021    ALT 20 06/07/2021    AST 17 06/07/2021     06/07/2021    K 4.0 06/07/2021     06/07/2021    CREATININE 0.62 06/07/2021    BUN 14 06/07/2021    CO2 27 06/07/2021       Assessment/Plan   Problem List Items Addressed This Visit    None  Visit Diagnoses         Codes    Annual physical exam    -  Primary Z00.00    Relevant Orders    Lipid panel    Comprehensive metabolic panel    BI mammo bilateral screening tomosynthesis    Encounter for screening mammogram for malignant neoplasm of breast     Z12.31    Relevant Orders    BI mammo bilateral screening tomosynthesis    Acute cough     R05.1    Relevant Medications    benzonatate (Tessalon) 200 mg capsule            Reinforced lifestyle modifications  Continue current medications as listed  Physical activity as tolerated and healthy diet encouraged  Maintain a healthy weight  Follow up in

## 2023-11-28 ENCOUNTER — LAB (OUTPATIENT)
Dept: LAB | Facility: LAB | Age: 43
End: 2023-11-28
Payer: COMMERCIAL

## 2023-11-28 DIAGNOSIS — Z00.00 ANNUAL PHYSICAL EXAM: ICD-10-CM

## 2023-11-28 LAB
ALBUMIN SERPL BCP-MCNC: 4.1 G/DL (ref 3.4–5)
ALP SERPL-CCNC: 42 U/L (ref 33–110)
ALT SERPL W P-5'-P-CCNC: 28 U/L (ref 7–45)
ANION GAP SERPL CALC-SCNC: 13 MMOL/L (ref 10–20)
AST SERPL W P-5'-P-CCNC: 23 U/L (ref 9–39)
BILIRUB SERPL-MCNC: 0.4 MG/DL (ref 0–1.2)
BUN SERPL-MCNC: 13 MG/DL (ref 6–23)
CALCIUM SERPL-MCNC: 9.2 MG/DL (ref 8.6–10.3)
CHLORIDE SERPL-SCNC: 105 MMOL/L (ref 98–107)
CHOLEST SERPL-MCNC: 195 MG/DL (ref 0–199)
CHOLESTEROL/HDL RATIO: 4.7
CO2 SERPL-SCNC: 27 MMOL/L (ref 21–32)
CREAT SERPL-MCNC: 0.71 MG/DL (ref 0.5–1.05)
GFR SERPL CREATININE-BSD FRML MDRD: >90 ML/MIN/1.73M*2
GLUCOSE SERPL-MCNC: 117 MG/DL (ref 74–99)
HDLC SERPL-MCNC: 41.1 MG/DL
LDLC SERPL CALC-MCNC: 107 MG/DL
NON HDL CHOLESTEROL: 154 MG/DL (ref 0–149)
POTASSIUM SERPL-SCNC: 4.2 MMOL/L (ref 3.5–5.3)
PROT SERPL-MCNC: 7 G/DL (ref 6.4–8.2)
SODIUM SERPL-SCNC: 141 MMOL/L (ref 136–145)
TRIGL SERPL-MCNC: 235 MG/DL (ref 0–149)
VLDL: 47 MG/DL (ref 0–40)

## 2023-11-28 PROCEDURE — 36415 COLL VENOUS BLD VENIPUNCTURE: CPT

## 2023-11-28 PROCEDURE — 80061 LIPID PANEL: CPT

## 2023-11-28 PROCEDURE — 80053 COMPREHEN METABOLIC PANEL: CPT

## 2023-11-30 ENCOUNTER — TELEMEDICINE (OUTPATIENT)
Dept: PRIMARY CARE | Facility: CLINIC | Age: 43
End: 2023-11-30
Payer: COMMERCIAL

## 2023-11-30 DIAGNOSIS — J20.9 ACUTE BRONCHITIS, UNSPECIFIED ORGANISM: Primary | ICD-10-CM

## 2023-11-30 PROCEDURE — 99443 PR PHYS/QHP TELEPHONE EVALUATION 21-30 MIN: CPT | Performed by: FAMILY MEDICINE

## 2023-11-30 RX ORDER — AZITHROMYCIN 250 MG/1
TABLET, FILM COATED ORAL
Qty: 6 TABLET | Refills: 0 | Status: SHIPPED | OUTPATIENT
Start: 2023-11-30 | End: 2023-12-05

## 2023-11-30 RX ORDER — PREDNISONE 20 MG/1
40 TABLET ORAL DAILY
Qty: 10 TABLET | Refills: 0 | Status: SHIPPED | OUTPATIENT
Start: 2023-11-30 | End: 2023-12-05

## 2023-11-30 ASSESSMENT — ENCOUNTER SYMPTOMS
COUGH: 1
HEMOPTYSIS: 0
SORE THROAT: 1
RHINORRHEA: 1
FEVER: 0
HEADACHES: 1
CHILLS: 1
SHORTNESS OF BREATH: 1

## 2023-11-30 ASSESSMENT — COPD QUESTIONNAIRES: COPD: 0

## 2023-11-30 NOTE — PROGRESS NOTES
Subjective   Chief complaint: Alvin Ge is a 43 y.o. female who presents for Cough (PATIENT C/O COUGH X 2 WEEKS. PATIENT HAS RX FOR TESSALON PERLES THAT WAS RX'D ON MONDAY, BUT STATED THAT COUGH IS GETTING WORSE AND NOW HAS WHEEZING. ).    HPI:  Cough  This is a new problem. The current episode started 1 to 4 weeks ago. The problem has been gradually worsening. The problem occurs constantly. The cough is Productive of sputum. Associated symptoms include chest pain, chills, headaches, nasal congestion, postnasal drip, rhinorrhea, a sore throat and shortness of breath. Pertinent negatives include no fever or hemoptysis. The symptoms are aggravated by lying down. She has tried prescription cough suppressant for the symptoms. The treatment provided no relief. There is no history of asthma, bronchitis or COPD.       Objective   [facility] 11/19/2023 (Exact Date) Comment: Waiver signed  Physical Exam  Congested coughing no distress    Review of Systems   Constitutional:  Positive for chills. Negative for fever.   HENT:  Positive for postnasal drip, rhinorrhea and sore throat.    Respiratory:  Positive for cough and shortness of breath. Negative for hemoptysis.    Cardiovascular:  Positive for chest pain.   Neurological:  Positive for headaches.      I have reviewed and reconciled the medication list with the patient today.   Current Outpatient Medications:     benzonatate (Tessalon) 200 mg capsule, Take 1 capsule (200 mg) by mouth 3 times a day as needed for cough. Do not crush or chew., Disp: 42 capsule, Rfl: 0    dilTIAZem (Cardizem) 30 mg immediate release tablet, Take 1 tablet (30 mg) by mouth., Disp: , Rfl:     magnesium oxide 400 mg magnesium capsule, Take by mouth., Disp: , Rfl:     nebivolol (Bystolic) 2.5 mg tablet, Take 1 tablet (2.5 mg) by mouth once daily., Disp: , Rfl:     omeprazole (PriLOSEC) 20 mg DR capsule, Take 1 capsule (20 mg) by mouth. Do not crush or chew., Disp: , Rfl:      Imaging:  Colonoscopy  Diagnostic    Result Date: 11/20/2023  Table formatting from the original result was not included. Impression 2 subcentimeter polyps in the splenic flexure and sigmoid colon were removed with cold snare Diverticulosis in the descending colon and sigmoid colon Medium hemorrhoids Findings Two 4 mm sessile polyps in the splenic flexure and sigmoid colon; performed cold snare with complete en bloc removal and retrieved specimen Multiple medium diverticula in the descending colon and sigmoid colon; no bleeding was identified External medium hemorrhoids observed during retroflexion; no bleeding was identified Recommendation  Await pathology results  Repeat colonoscopy in 5 years  Follow up with PCP       Follow up with SINGH Martini Indication Hematochezia, Change in bowel habits Post Procedure Diagnosis Colon polyps Diverticulosis Hemorrhoids Staff Staff Role No Staff Documented Medications See Anesthesia Record. Preprocedure A history and physical has been performed, and patient medication allergies have been reviewed. The patient's tolerance of previous anesthesia has been reviewed. The risks and benefits of the procedure and the sedation options and risks were discussed with the patient. All questions were answered and informed consent obtained. Details of the Procedure The patient underwent monitored anesthesia care, which was administered by an anesthesia professional. The patient's blood pressure, heart rate, level of consciousness, respirations, oxygen, ECG and ETCO2 were monitored throughout the procedure. A digital rectal exam was performed. The scope was introduced through the anus and advanced to the cecum. Retroflexion was performed in the rectum. The quality of bowel preparation was evaluated using the [place] Bowel Preparation Scale with scores of: right colon = 2, transverse colon = 2, left colon = 2. The total BBPS score was 6. Bowel prep was adequate. The patient's estimated blood loss was minimal (<5  mL). The procedure was not difficult. The patient tolerated the procedure well. There were no apparent adverse events. Events Procedure Events Event Event Time ENDO SCOPE IN TIME 11/20/2023 11:06 AM ENDO CECUM REACHED 11/20/2023 11:11 AM ENDO SCOPE OUT TIME 11/20/2023 11:25 AM Specimens ID Type Source Tests Collected by Time 1 :  Tissue COLON - SPLENIC FLEXURE POLYP SURGICAL PATHOLOGY EXAM Marisa Jenkins RN 11/20/2023 1115 2 :  Tissue COLON - SIGMOID POLYP SURGICAL PATHOLOGY EXAM Marisa Jenkins RN 11/20/2023 1122 Procedure Location [facility] External Facility [facility] [address] [facility] [address] Referring Provider Kay Tellez Pa-c [address] [address] Procedure Provider Steven Dickerson MD       Labs reviewed:    Lab Results   Component Value Date    CHOL 195 11/28/2023    TRIG 235 (H) 11/28/2023    HDL 41.1 11/28/2023    ALT 28 11/28/2023    AST 23 11/28/2023     11/28/2023    K 4.2 11/28/2023     11/28/2023    CREATININE 0.71 11/28/2023    BUN 13 11/28/2023    CO2 27 11/28/2023       Assessment/Plan   Problem List Items Addressed This Visit    None  Visit Diagnoses         Codes    Acute bronchitis, unspecified organism    -  Primary J20.9          22 min  Reinforced lifestyle modifications  Continue current medications as listed  Physical activity as tolerated and healthy diet encouraged  Maintain a healthy weight  Follow up in

## 2023-12-05 LAB
LABORATORY COMMENT REPORT: NORMAL
PATH REPORT.FINAL DX SPEC: NORMAL
PATH REPORT.GROSS SPEC: NORMAL
PATH REPORT.MICROSCOPIC SPEC OTHER STN: NORMAL
PATH REPORT.RELEVANT HX SPEC: NORMAL
PATH REPORT.TOTAL CANCER: NORMAL

## 2023-12-18 ENCOUNTER — APPOINTMENT (OUTPATIENT)
Dept: GASTROENTEROLOGY | Facility: CLINIC | Age: 43
End: 2023-12-18
Payer: COMMERCIAL

## 2023-12-29 ENCOUNTER — TELEPHONE (OUTPATIENT)
Dept: PRIMARY CARE | Facility: CLINIC | Age: 43
End: 2023-12-29
Payer: COMMERCIAL

## 2023-12-29 NOTE — TELEPHONE ENCOUNTER
Patient called office advised that she tested positive for COVID and she is having intense ear pain, she is wanting to know if Bob Linda MD would call something in for her. Advised that with it being 4:45 on Friday I would more than likely not have an answer for her before the weekend but I could still send the message however she should go to Urgent care or ER if her sx's are bad, patient verbalized understanding but still wanted me to forward the message to Bob Linda MD

## 2024-08-13 ENCOUNTER — OFFICE VISIT (OUTPATIENT)
Dept: PRIMARY CARE | Facility: CLINIC | Age: 44
End: 2024-08-13
Payer: COMMERCIAL

## 2024-08-13 VITALS
OXYGEN SATURATION: 98 % | DIASTOLIC BLOOD PRESSURE: 80 MMHG | HEIGHT: 66 IN | TEMPERATURE: 98.3 F | WEIGHT: 200.2 LBS | SYSTOLIC BLOOD PRESSURE: 136 MMHG | BODY MASS INDEX: 32.17 KG/M2 | HEART RATE: 77 BPM

## 2024-08-13 DIAGNOSIS — K62.89 RECTAL PAIN: ICD-10-CM

## 2024-08-13 DIAGNOSIS — K64.9 ACUTE HEMORRHOID: Primary | ICD-10-CM

## 2024-08-13 PROCEDURE — 99214 OFFICE O/P EST MOD 30 MIN: CPT | Performed by: INTERNAL MEDICINE

## 2024-08-13 PROCEDURE — 3008F BODY MASS INDEX DOCD: CPT | Performed by: INTERNAL MEDICINE

## 2024-08-13 RX ORDER — HYDROCORTISONE ACETATE 25 MG/1
25 SUPPOSITORY RECTAL 4 TIMES DAILY PRN
Qty: 12 SUPPOSITORY | Refills: 0 | Status: SHIPPED | OUTPATIENT
Start: 2024-08-13 | End: 2025-08-13

## 2024-08-13 ASSESSMENT — ENCOUNTER SYMPTOMS
ACTIVITY CHANGE: 0
STRIDOR: 0
HEMATURIA: 0
EYE REDNESS: 0
DEPRESSION: 0
BLOOD IN STOOL: 0
FEVER: 0
CHEST TIGHTNESS: 0
SPEECH DIFFICULTY: 0
PALPITATIONS: 0
JOINT SWELLING: 0
LIGHT-HEADEDNESS: 0

## 2024-08-13 ASSESSMENT — PATIENT HEALTH QUESTIONNAIRE - PHQ9
SUM OF ALL RESPONSES TO PHQ9 QUESTIONS 1 AND 2: 0
2. FEELING DOWN, DEPRESSED OR HOPELESS: NOT AT ALL
1. LITTLE INTEREST OR PLEASURE IN DOING THINGS: NOT AT ALL

## 2024-08-14 ENCOUNTER — TELEPHONE (OUTPATIENT)
Dept: PRIMARY CARE | Facility: CLINIC | Age: 44
End: 2024-08-14
Payer: COMMERCIAL

## 2024-08-14 NOTE — PROGRESS NOTES
davin Cooper 43 y.o. female was seen today:   Chief Complaint   Patient presents with    Hemorrhoids     Patient here for c/o sharp rectal pain and hemorrhoids. Patient stated that she has had issues with bowels and bowel movement appears skinny.        VISIT SUMMERY:  Patient is somewhat severely rectal pain.  She is not able to sit on the chair.  It hurts.  She does have history of hemorrhoids.  Patient has been having hemorrhoidal pain.  However she denies any bleeding.  She also feels some form of mass coming out of her rectum.  But she is sure this is not rectal prolapse.  She tried some over-the-counter medications including glycerin suppository.  She is trying to keep her bowels soft.  In the past the home remedies is to help however at this time it became so swollen and red and inflamed that she is requesting prescription help.  I will give her hydrocortisone suppository for inflammation.  However if she does not get any better in the next 48 hours then patient was told to come back we may have to talk to anorectal surgeon for better visualization via proctoscopy or flex sigmoidoscopy.        TODAY'S VISIT  DX:   1. Acute hemorrhoid  hydrocortisone (Anusol-HC) 25 mg suppository      2. Rectal pain  Secondary to inflamed rectum.  If he does not respond with the above suppository patient should be coming back we may have to send her to an rectal surgeon           MEDICAL DECISION MAKING:  - The current and active medical co morbidities have been considered.   - Recent lab work and relevant imaging studies have been reviewed.    - Relevant correspondence/notes from other specialty consultants were reviewed.    - Medication have been sent for refill.    - Therapy and treatment as per above plan   - Next Follow up in 2 to 3 days if not better    Review of Systems   Constitutional:  Negative for activity change and fever.   HENT:  Negative for hearing loss, nosebleeds and tinnitus.    Eyes:  Negative  "for redness.   Respiratory:  Negative for chest tightness and stridor.    Cardiovascular:  Negative for chest pain, palpitations and leg swelling.   Gastrointestinal:  Negative for blood in stool.   Endocrine: Negative for cold intolerance.   Genitourinary:  Negative for hematuria.   Musculoskeletal:  Negative for joint swelling.   Skin:  Negative for rash.   Neurological:  Negative for speech difficulty and light-headedness.   Psychiatric/Behavioral:  Negative for behavioral problems.      Visit Vitals  /80 (BP Location: Left arm, Patient Position: Sitting, BP Cuff Size: Adult)   Pulse 77   Temp 36.8 °C (98.3 °F) (Temporal)   Ht 1.664 m (5' 5.5\")   Wt 90.8 kg (200 lb 3.2 oz)   SpO2 98%   BMI 32.81 kg/m²   OB Status Having periods   Smoking Status Former   BSA 2.05 m²         Wt Readings from Last 10 Encounters:   08/13/24 90.8 kg (200 lb 3.2 oz)   11/27/23 96.2 kg (212 lb)   11/20/23 95.3 kg (210 lb)   11/15/23 95.5 kg (210 lb 8 oz)   10/30/23 97.4 kg (214 lb 12.8 oz)   03/10/23 89.8 kg (198 lb)   06/10/22 87.3 kg (192 lb 8 oz)   05/31/22 89 kg (196 lb 3.2 oz)   11/15/21 94.8 kg (209 lb)     Physical Exam  Constitutional:       General: She is not in acute distress.     Appearance: Normal appearance.   HENT:      Head: Normocephalic.      Right Ear: Tympanic membrane normal.      Left Ear: Tympanic membrane normal.      Mouth/Throat:      Mouth: Mucous membranes are moist.   Cardiovascular:      Rate and Rhythm: Normal rate and regular rhythm.      Heart sounds: No murmur heard.  Pulmonary:      Effort: No respiratory distress.   Abdominal:      Palpations: Abdomen is soft.   Musculoskeletal:      Cervical back: Neck supple.      Right lower leg: No edema.      Left lower leg: No edema.   Skin:     Findings: No rash.   Neurological:      General: No focal deficit present.      Mental Status: She is alert and oriented to person, place, and time.   Psychiatric:         Mood and Affect: Mood normal.      "     RECENT LABS:  Lab Results   Component Value Date    CHOL 195 11/28/2023    TRIG 235 (H) 11/28/2023    HDL 41.1 11/28/2023    ALT 28 11/28/2023    AST 23 11/28/2023     11/28/2023    K 4.2 11/28/2023     11/28/2023    CREATININE 0.71 11/28/2023    BUN 13 11/28/2023    CO2 27 11/28/2023     Lab Results   Component Value Date    GLUCOSE 117 (H) 11/28/2023    CALCIUM 9.2 11/28/2023     11/28/2023    K 4.2 11/28/2023    CO2 27 11/28/2023     11/28/2023    BUN 13 11/28/2023    CREATININE 0.71 11/28/2023      Lab Results   Component Value Date    LDLCALC 107 (H) 11/28/2023       IMAGING:  === 10/30/23 ===  XR ABDOMEN 1 VIEW  Nonspecific bowel-gas pattern  The need for further workup should be determined clinically.     === 05/31/23 ===  MR BRAIN W AND WO CONTRAST  No acute intracranial process.      MEDICATIONS:   Current Outpatient Medications   Medication Instructions    dilTIAZem (CARDIZEM) 30 mg, oral, If needed    hydrocortisone (ANUSOL-HC) 25 mg, rectal, 4 times daily PRN    magnesium oxide 400 mg magnesium capsule oral    nebivolol (BYSTOLIC) 2.5 mg, oral, Daily RT    omeprazole (PRILOSEC) 20 mg, oral, Do not crush or chew.

## 2024-10-18 ENCOUNTER — OFFICE VISIT (OUTPATIENT)
Dept: URGENT CARE | Age: 44
End: 2024-10-18
Payer: COMMERCIAL

## 2024-10-18 VITALS
HEIGHT: 66 IN | BODY MASS INDEX: 30.53 KG/M2 | SYSTOLIC BLOOD PRESSURE: 164 MMHG | DIASTOLIC BLOOD PRESSURE: 98 MMHG | RESPIRATION RATE: 18 BRPM | WEIGHT: 190 LBS | OXYGEN SATURATION: 99 % | HEART RATE: 84 BPM | TEMPERATURE: 98.5 F

## 2024-10-18 DIAGNOSIS — L03.119 CELLULITIS OF UPPER EXTREMITY, UNSPECIFIED LATERALITY: Primary | ICD-10-CM

## 2024-10-18 DIAGNOSIS — L29.9 GENERALIZED PRURITUS: ICD-10-CM

## 2024-10-18 RX ORDER — MUPIROCIN 20 MG/G
OINTMENT TOPICAL 3 TIMES DAILY
Qty: 22 G | Refills: 0 | Status: SHIPPED | OUTPATIENT
Start: 2024-10-18 | End: 2024-10-28

## 2024-10-18 RX ORDER — CEPHALEXIN 500 MG/1
500 CAPSULE ORAL 4 TIMES DAILY
Qty: 28 CAPSULE | Refills: 0 | Status: SHIPPED | OUTPATIENT
Start: 2024-10-18 | End: 2024-10-25

## 2024-10-18 RX ORDER — METHYLPREDNISOLONE 4 MG/1
TABLET ORAL
Qty: 21 TABLET | Refills: 0 | Status: SHIPPED | OUTPATIENT
Start: 2024-10-18 | End: 2024-10-25

## 2024-10-18 ASSESSMENT — ENCOUNTER SYMPTOMS
CONSTITUTIONAL NEGATIVE: 1
HEMATOLOGIC/LYMPHATIC NEGATIVE: 1
GASTROINTESTINAL NEGATIVE: 1
WOUND: 1
RESPIRATORY NEGATIVE: 1
CARDIOVASCULAR NEGATIVE: 1
COLOR CHANGE: 1
MUSCULOSKELETAL NEGATIVE: 1
EYES NEGATIVE: 1

## 2024-10-18 NOTE — PATIENT INSTRUCTIONS
Antibiotic ointment twice daily  Wash with antibacterial soap twice daily  Keep open lesions covered  Seek care if fever, chills, flu-like symptoms, nausea, vomiting

## 2024-10-18 NOTE — PROGRESS NOTES
"Subjective   Patient ID: Alvin Ge is a 43 y.o. female. They present today with a chief complaint of Rash (Rash on bilateral arms SLOWLY spreading.  Red, swollen, itching, oozing x 1 week).    History of Present Illness  Subjective  Alvin Ge is a 43 y.o. female who presents for evaluation of rash. Rash started 3 days ago. Initial distribution: bilateral arm. Lesions are red in color, are of blistering texture, 0.5 by 0.5 cm min size. Rash has changed over time.  Rash is pruritic. Associated symptoms: none. Patient denies: abdominal pain, arthralgia, congestion, cough, decrease in appetite, decrease in energy level, fever, headache, myalgia, nausea, sore throat, and vomiting. Patient has not had previous evaluation of rash. Patient has not had previous treatment. Patient has not had contacts with similar rash. Patient has not had new exposures.    Objective  BP (!) 164/98 (BP Location: Left arm, Patient Position: Sitting)   Pulse 84   Temp 36.9 °C (98.5 °F)   Resp 18   Ht 1.664 m (5' 5.5\")   Wt 86.2 kg (190 lb)   SpO2 99%   BMI 31.14 kg/m²   [unfilled]    Assessment/Plan  Cellulitis    Aveeno baths  Benadryl prn for itching.  Follow up in 5 days if there is no improvement.  Information on the above diagnosis was given to the patient.  Reassurance was given to the patient.  Rx: cephalexin, mupirocin, medrol dose pack  Tylenol or Ibuprofen for pain, fever.  Watch for signs of fever or worsening of the rash..        History provided by:  Patient   used: No    Rash        Past Medical History  Allergies as of 10/18/2024 - Reviewed 10/18/2024   Allergen Reaction Noted    Covid-19 vaccine, mrna, [id number], lnp-s (moderna) Unknown 10/28/2023    Gluten Unknown 10/28/2023       (Not in a hospital admission)       Past Medical History:   Diagnosis Date    A-fib (Multi)     Gastro-esophageal reflux disease with esophagitis, without bleeding 10/10/2017    Gastroesophageal reflux disease with " "esophagitis    Gluten intolerance     Hx of colonoscopy     LBBB (left bundle branch block)        Past Surgical History:   Procedure Laterality Date    CHOLECYSTECTOMY  09/05/2017    Cholecystectomy    COLONOSCOPY N/A 11/20/2023    TUBAL LIGATION  09/05/2017    Tubal Ligation        reports that she quit smoking about 7 years ago. Her smoking use included cigarettes. She does not have any smokeless tobacco history on file. She reports current alcohol use. She reports that she does not use drugs.    Review of Systems  Review of Systems   Constitutional: Negative.    HENT: Negative.     Eyes: Negative.    Respiratory: Negative.     Cardiovascular: Negative.    Gastrointestinal: Negative.    Genitourinary: Negative.    Musculoskeletal: Negative.    Skin:  Positive for color change, rash and wound. Negative for pallor.   Hematological: Negative.                                   Objective    Vitals:    10/18/24 0852   BP: (!) 164/98   BP Location: Left arm   Patient Position: Sitting   Pulse: 84   Resp: 18   Temp: 36.9 °C (98.5 °F)   SpO2: 99%   Weight: 86.2 kg (190 lb)   Height: 1.664 m (5' 5.5\")     No LMP recorded.    Physical Exam  Vitals and nursing note reviewed.   Constitutional:       General: She is not in acute distress.     Appearance: Normal appearance. She is normal weight. She is not ill-appearing, toxic-appearing or diaphoretic.   Cardiovascular:      Rate and Rhythm: Normal rate and regular rhythm.      Pulses: Normal pulses.      Heart sounds: Normal heart sounds.   Pulmonary:      Effort: Pulmonary effort is normal.      Breath sounds: Normal breath sounds.   Skin:     General: Skin is warm and dry.      Findings: Erythema, lesion and rash present.      Comments: Bilateral forearms with erythematous papules, macules with scabbing and surrounding areas of cellulitis.  No drainage present.    Neurological:      General: No focal deficit present.      Mental Status: She is alert. "         Procedures    Point of Care Test & Imaging Results from this visit  No results found for this visit on 10/18/24.   No results found.    Diagnostic study results (if any) were reviewed by HILARY Carlin.    Assessment/Plan   Allergies, medications, history, and pertinent labs/EKGs/Imaging reviewed by HILARY Carlin.     Medical Decision Making    Secondary bacterial infection from scratching pruritic lesions.  Treating with oral and topical antibiotic and oral corticosteroid for pruritus.    At time of discharge patient was clinically well-appearing and HDS for outpatient management. The patient and/or family was educated regarding diagnosis, supportive care, OTC and Rx medications. The patient and/or family was given the opportunity to ask questions prior to discharge. They verbalized understanding of my discussion of the plans for treatment, expected course, indications to return to  or seek further evaluation in ED, and the need for timely follow up as directed.     Orders and Diagnoses  Diagnoses and all orders for this visit:  Cellulitis of upper extremity, unspecified laterality  -     mupirocin (Bactroban) 2 % ointment; Apply topically 3 times a day for 10 days.  -     cephalexin (Keflex) 500 mg capsule; Take 1 capsule (500 mg) by mouth 4 times a day for 7 days.  Generalized pruritus  -     methylPREDNISolone (Medrol Dospak) 4 mg tablets; Take as directed on package.      Medical Admin Record      Patient disposition: Home    Electronically signed by HILARY Carlin  9:22 AM

## 2024-10-28 ENCOUNTER — OFFICE VISIT (OUTPATIENT)
Dept: PRIMARY CARE | Facility: CLINIC | Age: 44
End: 2024-10-28
Payer: COMMERCIAL

## 2024-10-28 VITALS
DIASTOLIC BLOOD PRESSURE: 86 MMHG | SYSTOLIC BLOOD PRESSURE: 138 MMHG | BODY MASS INDEX: 31.88 KG/M2 | WEIGHT: 198.4 LBS | OXYGEN SATURATION: 98 % | HEART RATE: 80 BPM | HEIGHT: 66 IN | TEMPERATURE: 98.2 F

## 2024-10-28 DIAGNOSIS — B86 SCABIES: ICD-10-CM

## 2024-10-28 DIAGNOSIS — L23.7 POISON IVY DERMATITIS: Primary | ICD-10-CM

## 2024-10-28 PROCEDURE — 3008F BODY MASS INDEX DOCD: CPT | Performed by: INTERNAL MEDICINE

## 2024-10-28 PROCEDURE — 99214 OFFICE O/P EST MOD 30 MIN: CPT | Performed by: INTERNAL MEDICINE

## 2024-10-28 PROCEDURE — 1036F TOBACCO NON-USER: CPT | Performed by: INTERNAL MEDICINE

## 2024-10-28 RX ORDER — METHYLPREDNISOLONE 4 MG/1
TABLET ORAL
Qty: 21 TABLET | Refills: 0 | Status: SHIPPED | OUTPATIENT
Start: 2024-10-28

## 2024-10-28 RX ORDER — HYDROXYZINE HYDROCHLORIDE 25 MG/1
25 TABLET, FILM COATED ORAL NIGHTLY
Qty: 30 TABLET | Refills: 0 | Status: SHIPPED | OUTPATIENT
Start: 2024-10-28 | End: 2024-11-27

## 2024-10-28 RX ORDER — TRIAMCINOLONE ACETONIDE 40 MG/ML
40 INJECTION, SUSPENSION INTRA-ARTICULAR; INTRAMUSCULAR ONCE
Status: COMPLETED | OUTPATIENT
Start: 2024-10-28 | End: 2024-10-29

## 2024-10-28 RX ORDER — METHYLPREDNISOLONE ACETATE 80 MG/ML
80 INJECTION, SUSPENSION INTRA-ARTICULAR; INTRALESIONAL; INTRAMUSCULAR; SOFT TISSUE ONCE
Status: DISCONTINUED | OUTPATIENT
Start: 2024-10-28 | End: 2024-10-28

## 2024-10-28 RX ORDER — PERMETHRIN 50 MG/G
CREAM TOPICAL DAILY
Qty: 60 G | Refills: 0 | Status: SHIPPED | OUTPATIENT
Start: 2024-10-28 | End: 2024-10-30

## 2024-10-28 RX ORDER — PREDNISONE 20 MG/1
20 TABLET ORAL 2 TIMES DAILY
Qty: 10 TABLET | Refills: 0 | Status: SHIPPED | OUTPATIENT
Start: 2024-10-28 | End: 2024-10-28 | Stop reason: ENTERED-IN-ERROR

## 2024-10-28 RX ORDER — METHYLPREDNISOLONE 4 MG/1
TABLET ORAL
Qty: 21 TABLET | Refills: 0 | Status: SHIPPED | OUTPATIENT
Start: 2024-10-28 | End: 2024-10-28 | Stop reason: ENTERED-IN-ERROR

## 2024-10-28 RX ORDER — METHYLPREDNISOLONE ACETATE 40 MG/ML
80 INJECTION, SUSPENSION INTRA-ARTICULAR; INTRALESIONAL; INTRAMUSCULAR; SOFT TISSUE ONCE
Status: COMPLETED | OUTPATIENT
Start: 2024-10-28 | End: 2024-10-29

## 2024-10-28 ASSESSMENT — PATIENT HEALTH QUESTIONNAIRE - PHQ9
2. FEELING DOWN, DEPRESSED OR HOPELESS: NOT AT ALL
SUM OF ALL RESPONSES TO PHQ9 QUESTIONS 1 AND 2: 0
1. LITTLE INTEREST OR PLEASURE IN DOING THINGS: NOT AT ALL

## 2024-10-29 PROCEDURE — 96372 THER/PROPH/DIAG INJ SC/IM: CPT | Performed by: INTERNAL MEDICINE

## 2025-02-04 ENCOUNTER — OFFICE VISIT (OUTPATIENT)
Dept: URGENT CARE | Age: 45
End: 2025-02-04
Payer: COMMERCIAL

## 2025-02-04 VITALS
HEART RATE: 110 BPM | TEMPERATURE: 97.7 F | BODY MASS INDEX: 32.12 KG/M2 | SYSTOLIC BLOOD PRESSURE: 133 MMHG | OXYGEN SATURATION: 99 % | WEIGHT: 196 LBS | DIASTOLIC BLOOD PRESSURE: 86 MMHG | RESPIRATION RATE: 18 BRPM

## 2025-02-04 DIAGNOSIS — R68.89 FLU-LIKE SYMPTOMS: ICD-10-CM

## 2025-02-04 DIAGNOSIS — U07.1 COVID: Primary | ICD-10-CM

## 2025-02-04 DIAGNOSIS — H66.91 RIGHT OTITIS MEDIA, UNSPECIFIED OTITIS MEDIA TYPE: ICD-10-CM

## 2025-02-04 LAB
POC RAPID INFLUENZA A: NEGATIVE
POC RAPID INFLUENZA B: NEGATIVE
POC SARS-COV-2 AG BINAX: ABNORMAL

## 2025-02-04 PROCEDURE — 87804 INFLUENZA ASSAY W/OPTIC: CPT | Performed by: FAMILY MEDICINE

## 2025-02-04 PROCEDURE — 87811 SARS-COV-2 COVID19 W/OPTIC: CPT | Performed by: FAMILY MEDICINE

## 2025-02-04 PROCEDURE — 99213 OFFICE O/P EST LOW 20 MIN: CPT | Performed by: FAMILY MEDICINE

## 2025-02-04 PROCEDURE — 1036F TOBACCO NON-USER: CPT | Performed by: FAMILY MEDICINE

## 2025-02-04 RX ORDER — AMOXICILLIN 875 MG/1
875 TABLET, FILM COATED ORAL 2 TIMES DAILY
Qty: 20 TABLET | Refills: 0 | Status: SHIPPED | OUTPATIENT
Start: 2025-02-04 | End: 2025-02-14

## 2025-02-04 ASSESSMENT — ENCOUNTER SYMPTOMS
COUGH: 1
CHEST TIGHTNESS: 0
SORE THROAT: 1
HEADACHES: 1
CHILLS: 1
EYE DISCHARGE: 0
SHORTNESS OF BREATH: 0
FEVER: 1
RHINORRHEA: 1
SINUS PRESSURE: 0
EYE REDNESS: 0
SINUS PAIN: 0
WHEEZING: 0
EYE PAIN: 1

## 2025-02-04 NOTE — PATIENT INSTRUCTIONS
Follow up with your doctor.  OTC meds as needed.  Isolate for 2 days followed by 5 days of mask wearing and socially distancing.

## 2025-02-04 NOTE — PROGRESS NOTES
Subjective   Patient ID: Alvin Ge is a 44 y.o. female. They present today with a chief complaint of Cough, URI, Nasal Congestion, and Fever.    History of Present Illness    History provided by:  Patient   used: No    Cough  This is a new problem. The current episode started in the past 7 days (2/1/25). The problem has been gradually improving. The problem occurs every few hours. The cough is Productive of sputum. Associated symptoms include chills, a fever, headaches, rhinorrhea and a sore throat. Pertinent negatives include no chest pain, ear pain, eye redness, shortness of breath or wheezing. Treatments tried: NyQuil and Mucinex. The treatment provided mild relief.       Past Medical History  Allergies as of 02/04/2025 - Reviewed 02/04/2025   Allergen Reaction Noted    Covid-19 vaccine, mrna, [id number], lnp-s (moderna) Unknown 10/28/2023    Gluten Unknown 10/28/2023       (Not in a hospital admission)       Past Medical History:   Diagnosis Date    A-fib (Multi)     Gastro-esophageal reflux disease with esophagitis, without bleeding 10/10/2017    Gastroesophageal reflux disease with esophagitis    Gluten intolerance     Hx of colonoscopy     LBBB (left bundle branch block)        Past Surgical History:   Procedure Laterality Date    CHOLECYSTECTOMY  09/05/2017    Cholecystectomy    COLONOSCOPY N/A 11/20/2023    TUBAL LIGATION  09/05/2017    Tubal Ligation        reports that she quit smoking about 8 years ago. Her smoking use included cigarettes. She has never used smokeless tobacco. She reports current alcohol use. She reports that she does not use drugs.    Review of Systems  Review of Systems   Constitutional:  Positive for chills and fever.   HENT:  Positive for congestion, rhinorrhea and sore throat. Negative for ear pain, sinus pressure and sinus pain.         Ear Blockage   Eyes:  Positive for pain. Negative for discharge and redness.   Respiratory:  Positive for cough. Negative  for chest tightness, shortness of breath and wheezing.    Cardiovascular:  Negative for chest pain.   Neurological:  Positive for headaches.                                  Objective    Vitals:    02/04/25 1028   BP: 133/86   Pulse: 110   Resp: 18   Temp: 36.5 °C (97.7 °F)   SpO2: 99%   Weight: 88.9 kg (196 lb)     Patient's last menstrual period was 01/06/2025.    Physical Exam  Vitals reviewed.   Constitutional:       General: She is not in acute distress.     Appearance: She is ill-appearing.   HENT:      Right Ear: Ear canal normal. Tympanic membrane is erythematous.      Left Ear: Tympanic membrane and ear canal normal. Tympanic membrane is not erythematous.      Nose: Nose normal.      Mouth/Throat:      Mouth: Mucous membranes are moist.      Pharynx: No posterior oropharyngeal erythema.   Eyes:      Extraocular Movements: Extraocular movements intact.      Conjunctiva/sclera: Conjunctivae normal.      Pupils: Pupils are equal, round, and reactive to light.   Cardiovascular:      Rate and Rhythm: Normal rate and regular rhythm.      Heart sounds: No murmur heard.     No friction rub.   Pulmonary:      Effort: Pulmonary effort is normal. No respiratory distress.      Breath sounds: No wheezing, rhonchi or rales.   Lymphadenopathy:      Cervical: No cervical adenopathy.   Neurological:      Mental Status: She is alert.         Procedures    Point of Care Test & Imaging Results from this visit  No results found for this visit on 02/04/25.   No results found.    Diagnostic study results (if any) were reviewed by Theo Simon DO.    Assessment/Plan   Allergies, medications, history, and pertinent labs/EKGs/Imaging reviewed by Theo Simon DO.       Orders and Diagnoses  There are no diagnoses linked to this encounter.    Medical Admin Record      Patient disposition: Home    Electronically signed by Theo Simon DO  10:33 AM

## 2025-04-28 ENCOUNTER — OFFICE VISIT (OUTPATIENT)
Dept: URGENT CARE | Age: 45
End: 2025-04-28
Payer: COMMERCIAL

## 2025-04-28 ENCOUNTER — APPOINTMENT (OUTPATIENT)
Dept: PRIMARY CARE | Facility: CLINIC | Age: 45
End: 2025-04-28
Payer: COMMERCIAL

## 2025-04-28 VITALS
TEMPERATURE: 98.3 F | BODY MASS INDEX: 31.5 KG/M2 | DIASTOLIC BLOOD PRESSURE: 86 MMHG | OXYGEN SATURATION: 98 % | WEIGHT: 196 LBS | RESPIRATION RATE: 18 BRPM | HEART RATE: 103 BPM | HEIGHT: 66 IN | SYSTOLIC BLOOD PRESSURE: 137 MMHG

## 2025-04-28 DIAGNOSIS — L23.7 ALLERGIC CONTACT DERMATITIS DUE TO PLANTS, EXCEPT FOOD: Primary | ICD-10-CM

## 2025-04-28 PROCEDURE — 3008F BODY MASS INDEX DOCD: CPT | Performed by: FAMILY MEDICINE

## 2025-04-28 PROCEDURE — 1036F TOBACCO NON-USER: CPT | Performed by: FAMILY MEDICINE

## 2025-04-28 PROCEDURE — 99214 OFFICE O/P EST MOD 30 MIN: CPT | Performed by: FAMILY MEDICINE

## 2025-04-28 PROCEDURE — 96372 THER/PROPH/DIAG INJ SC/IM: CPT | Performed by: FAMILY MEDICINE

## 2025-04-28 RX ORDER — METHYLPREDNISOLONE 4 MG/1
TABLET ORAL
Qty: 21 TABLET | Refills: 0 | Status: SHIPPED | OUTPATIENT
Start: 2025-04-28 | End: 2025-05-04

## 2025-04-28 RX ORDER — METHYLPREDNISOLONE SODIUM SUCCINATE 125 MG/2ML
125 INJECTION INTRAMUSCULAR; INTRAVENOUS ONCE
Status: COMPLETED | OUTPATIENT
Start: 2025-04-28 | End: 2025-04-28

## 2025-04-28 RX ADMIN — METHYLPREDNISOLONE SODIUM SUCCINATE 125 MG: 125 INJECTION INTRAMUSCULAR; INTRAVENOUS at 09:56

## 2025-04-28 ASSESSMENT — PATIENT HEALTH QUESTIONNAIRE - PHQ9
1. LITTLE INTEREST OR PLEASURE IN DOING THINGS: NOT AT ALL
2. FEELING DOWN, DEPRESSED OR HOPELESS: NOT AT ALL
SUM OF ALL RESPONSES TO PHQ9 QUESTIONS 1 & 2: 0

## 2025-04-28 ASSESSMENT — PAIN SCALES - GENERAL: PAINLEVEL_OUTOF10: 0-NO PAIN

## 2025-04-28 ASSESSMENT — VISUAL ACUITY
OS_CC: 20/25
OD_CC: 20/25

## 2025-04-28 NOTE — PROGRESS NOTES
"Subjective   Patient ID: Alvin Ge is a 44 y.o. female. They present today with a chief complaint of Facial Swelling (Both eyes /Swollen,redness,painful/Happened 4 days ago /Has been taking benadryl ) and Rash (Right arm/Started 3 days ago ).    Patient disposition: Home    History of Present Illness  HPI  Bilateral eyelid swelling for the past 4 days and rash started on arms.  Has been taking Benadryl with minor improvement of symptoms.  Had something similar about a month ago.  Patient was cleaning out a front patch of guarding but no known poison ivy or similar plants in that area.  1 month ago was also doing something similar.  Woke up this morning both eyelids were swollen and could not open her eyes very well.  No discharge from the eyes.  No eye pain or blurriness.  Does not wear contacts.  Apply calamine lotion.  No recent cold symptoms      Past Medical History  Allergies as of 04/28/2025 - Reviewed 04/28/2025   Allergen Reaction Noted    Covid-19 vaccine, mrna, [id number], lnp-s (moderna) Unknown 10/28/2023    Gluten Unknown 10/28/2023       Prescriptions Prior to Admission[1]     Current Medications[2]    Problem List[3]    Surgical History[4]     reports that she quit smoking about 8 years ago. Her smoking use included cigarettes. She has never been exposed to tobacco smoke. She has never used smokeless tobacco. She reports current alcohol use. She reports that she does not use drugs.    Review of Systems  As noted in HPI. ROS otherwise negative unless noted.       Objective    Vitals:    04/28/25 0912   BP: 137/86   BP Location: Left arm   Patient Position: Sitting   Pulse: 103   Resp: 18   Temp: 36.8 °C (98.3 °F)   SpO2: 98%   Weight: 88.9 kg (196 lb)   Height: 1.664 m (5' 5.5\")     Patient's last menstrual period was 04/02/2025 (approximate).    Physical Exam  Constitutional: vital signs reviewed. Well developed, well nourished. patient alert and patient without distress.   Psych: Normal mood and " affect  Head and Face: Normal and atraumatic.    Cardiovascular: Heart rate normal, normal S1 and S2, no gallops, no murmurs and no pericardial rub. Rhythm: Normal.  Pulmonary: No respiratory distress. Palpation of chest: Normal. Clear bilateral breath sounds.   Skin: Normal skin color and pigmentation, normal skin turgor, bilateral upper and lower eyelids moderately edematous.  Bilateral sclera and conjunctiva normal with no drainage or injection.  No cervical lymphadenopathy.  Right wrist with 3 x 4 cm patch of similar dermatitis          Procedures    Point of Care Test & Imaging Results from this visit           Diagnostic study results (if any) were reviewed.  (If applicable) preliminary radiology reading: None    Assessment/Plan   Allergies, medications, history, and pertinent labs/EKGs/Imaging reviewed.        Medical Decision Making  See note    Orders and Diagnoses  There are no diagnoses linked to this encounter.    Medical Admin Record      Follow Up Instructions  No follow-ups on file.    At time of discharge patient was clinically well-appearing and HDS for outpatient management. The patient and/or family was educated regarding diagnosis, supportive care, OTC and Rx medications. The patient and/or family was given the opportunity to ask questions prior to discharge and all questions answered. They verbalized understanding of my discussion of the plans for treatment, expected course, indications to return to  or seek further evaluation in ED, and the need for timely follow up as directed.      Electronically signed by [facility]           [1] (Not in a hospital admission)  [2]   Current Outpatient Medications   Medication Sig Dispense Refill    dilTIAZem (Cardizem) 30 mg immediate release tablet Take 1 tablet (30 mg) by mouth. If needed      magnesium oxide 400 mg magnesium capsule Take by mouth.      nebivolol (Bystolic) 2.5 mg tablet Take 1 tablet (2.5 mg) by mouth once daily.      omeprazole  (PriLOSEC) 20 mg DR capsule Take 1 capsule (20 mg) by mouth. Do not crush or chew.      hydrocortisone (Anusol-HC) 25 mg suppository Insert 1 suppository (25 mg) into the rectum 4 times a day as needed for hemorrhoids. (Patient not taking: Reported on 4/28/2025) 12 suppository 0    hydrOXYzine HCL (Atarax) 25 mg tablet Take 1 tablet (25 mg) by mouth once daily at bedtime. 30 tablet 0    methylPREDNISolone (Medrol Dospak) 4 mg tablets Take as directed on package. (Patient not taking: Reported on 4/28/2025) 21 tablet 0     No current facility-administered medications for this visit.   [3]   Patient Active Problem List  Diagnosis    Abdominal bloating    Anxiety disorder    Atrial arrhythmia    Back spasm    GERD (gastroesophageal reflux disease)    Hiatal hernia    Gluten intolerance    Headache    Mammogram abnormal    Midline thoracic back pain    Occipital neuralgia    Palpitations    BOUBACAR on CPAP    Paresthesia of lower limb    Rectal bleeding    Rosacea    Shingles    Superficial sunburn    Vitamin D deficiency    Nausea and vomiting in adult    Vomiting    Other constipation   [4]   Past Surgical History:  Procedure Laterality Date    CHOLECYSTECTOMY  09/05/2017    Cholecystectomy    COLONOSCOPY N/A 11/20/2023    TUBAL LIGATION  09/05/2017    Tubal Ligation

## 2025-04-28 NOTE — PATIENT INSTRUCTIONS
You received a shot of Solu-Medrol here in the office.  This should start working today.  Continue taking Zyrtec or Benadryl.  Cool compress to the area to help reduce swelling.  Continue to monitor symptoms.    If symptoms not completely resolve in the next day or 2, oral steroid has been sent to the pharmacy, take as directed

## 2025-04-29 DIAGNOSIS — Z12.31 ENCOUNTER FOR SCREENING MAMMOGRAM FOR BREAST CANCER: ICD-10-CM

## 2025-05-19 ENCOUNTER — APPOINTMENT (OUTPATIENT)
Dept: PRIMARY CARE | Facility: CLINIC | Age: 45
End: 2025-05-19
Payer: COMMERCIAL

## 2025-05-20 ENCOUNTER — OFFICE VISIT (OUTPATIENT)
Dept: PRIMARY CARE | Facility: CLINIC | Age: 45
End: 2025-05-20
Payer: COMMERCIAL

## 2025-05-20 VITALS
SYSTOLIC BLOOD PRESSURE: 150 MMHG | DIASTOLIC BLOOD PRESSURE: 100 MMHG | HEART RATE: 87 BPM | BODY MASS INDEX: 32.3 KG/M2 | WEIGHT: 201 LBS | OXYGEN SATURATION: 96 % | TEMPERATURE: 98.7 F | HEIGHT: 66 IN

## 2025-05-20 DIAGNOSIS — K51.00 ULCERATIVE PANCOLITIS WITHOUT COMPLICATION (MULTI): ICD-10-CM

## 2025-05-20 DIAGNOSIS — R00.2 PALPITATIONS: ICD-10-CM

## 2025-05-20 DIAGNOSIS — Z02.89 PHYSICAL EXAM FOR CAMP: Primary | ICD-10-CM

## 2025-05-21 PROBLEM — K51.00 ULCERATIVE PANCOLITIS WITHOUT COMPLICATION (MULTI): Status: ACTIVE | Noted: 2023-10-28

## 2025-05-22 NOTE — PROGRESS NOTES
"Subjective     Patient ID: Alvin Ge is a 44 y.o. female who presents for Annual Exam (Camp Physical).    Virtual or Telephone Consent    While technically available, the patient was unable or unwilling to consent to connect via audio/video telehealth technology; therefore, I performed this visit using a real-time audio only connection between Alvin Ge & Raheem Middleton MD. Verbal consent was requested and obtained from Alvin Ge on this date, 05/21/25 for a telehealth visit and the patient's location was confirmed at the time of the visit.      History of Present Illness  The patient presents via virtual visit for a camp physical.    She is scheduled to attend a 5-day camp and requires a physical examination for the same. She reports no history of psychiatric disorders, seizures, or diabetes. She also confirms the absence of any communicable diseases such as influenza, COVID-19, or tuberculosis.    Her blood pressure was elevated during her last visit, which she attributes to a particularly stressful day involving four different doctor appointments, traffic congestion due to a broken-down garbage truck, and an ongoing dispute with her insurance company. She has not monitored her blood pressure today but is currently having it checked by a nurse. She is on Bystolic, which she takes only if her pulse exceeds 160. She took her Bystolic this morning, which she uses to manage her heart rate rather than her blood pressure. Her cardiologist is Dr. Reese.    Objective   Vitals:    05/20/25 1545   BP: (!) 150/100   Pulse: 87   Temp: 37.1 °C (98.7 °F)   TempSrc: Temporal   SpO2: 96%   Weight: 91.2 kg (201 lb)   Height: 1.664 m (5' 5.5\")     Wt Readings from Last 10 Encounters:   05/20/25 91.2 kg (201 lb)   04/28/25 88.9 kg (196 lb)   02/04/25 88.9 kg (196 lb)   10/28/24 90 kg (198 lb 6.4 oz)   10/18/24 86.2 kg (190 lb)   08/13/24 90.8 kg (200 lb 3.2 oz)   11/27/23 96.2 kg (212 lb)   11/20/23 95.3 kg (210 " lb)   11/15/23 95.5 kg (210 lb 8 oz)   10/30/23 97.4 kg (214 lb 12.8 oz)       Medication:  Current Outpatient Medications   Medication Instructions    dilTIAZem (CARDIZEM) 30 mg    magnesium oxide 400 mg magnesium capsule Take by mouth.    nebivolol (BYSTOLIC) 2.5 mg, Daily RT    omeprazole (PRILOSEC) 20 mg         Physical Exam  Constitutional:       General: She is not in acute distress.  HENT:      Nose: No rhinorrhea.   Pulmonary:      Effort: Pulmonary effort is normal.      Breath sounds: No stridor. No wheezing.   Neurological:      Mental Status: She is alert and oriented to person, place, and time.   Psychiatric:         Thought Content: Thought content normal.       Review of Systems:  Constitutional:  No activity change or fever   HENT:  Denies ringing ears or nose bleed   Respiratory:  Denies stridor. No blood in sputum   Cardiovascular:  Denies chest pain, no sudden excessive sweating   Gastrointestinal:  No sour burping, no blood in stool    Genitourinary:  Denies blood in urine    Musculoskeletal:  No joint redness or swelling    Skin:  No new spot changing color or shape or border    Neurological:  No speech difficulty, facial droop    Psychiatric/Behavioral:  No agitation, denies Hallucination     Recent Labs:   Lab Results   Component Value Date    CHOL 195 11/28/2023    TRIG 235 (H) 11/28/2023    HDL 41.1 11/28/2023    ALT 28 11/28/2023    AST 23 11/28/2023     11/28/2023    K 4.2 11/28/2023     11/28/2023    CREATININE 0.71 11/28/2023    BUN 13 11/28/2023    CO2 27 11/28/2023     Lab Results   Component Value Date    GLUCOSE 117 (H) 11/28/2023    CALCIUM 9.2 11/28/2023     11/28/2023    K 4.2 11/28/2023    CO2 27 11/28/2023     11/28/2023    BUN 13 11/28/2023    CREATININE 0.71 11/28/2023      Lab Results   Component Value Date    LDLCALC 107 (H) 11/28/2023     Lab Results   Component Value Date    LDLCALC 107 (H) 11/28/2023    CREATININE 0.71 11/28/2023       Diagnosis  and Orders:     Physical exam for camp  Palpitations         Assessment & Plan  1. Hypertension.  - Blood pressure readings are elevated at 150/100 and 159/100.  - Currently taking Bystolic for heart rate control, which also affects blood pressure.  - Advised to monitor blood pressure twice daily, once in the morning upon waking and once at night before bed, for the next 14 days. Keep a diary of these readings, including systolic, diastolic, and heart rate, and bring it to the next appointment with Dr. Madrigal.  - If blood pressure remains high, the dosage of Bystolic may need to be increased to twice daily.    2. Camp physical.  - Scheduled to attend a 5-day camp and requires a physical examination.  - Reports no history of psychiatric disorders, seizures, or diabetes.  - Confirms the absence of any communicable diseases such as influenza, COVID-19, or tuberculosis.        This medical note was created with the assistance of artificial intelligence (AI) for documentation purposes. The content has been reviewed and confirmed by the healthcare provider for accuracy and completeness. Patient consented to the use of audio recording and use of AI during their visit.